# Patient Record
Sex: MALE | Race: WHITE | NOT HISPANIC OR LATINO | Employment: OTHER | URBAN - METROPOLITAN AREA
[De-identification: names, ages, dates, MRNs, and addresses within clinical notes are randomized per-mention and may not be internally consistent; named-entity substitution may affect disease eponyms.]

---

## 2023-05-19 ENCOUNTER — HOSPITAL ENCOUNTER (OUTPATIENT)
Dept: DATA CONVERSION | Facility: HOSPITAL | Age: 78
End: 2023-05-19
Attending: ANESTHESIOLOGY | Admitting: ANESTHESIOLOGY
Payer: MEDICARE

## 2023-05-19 DIAGNOSIS — N18.6 END STAGE RENAL DISEASE (MULTI): ICD-10-CM

## 2023-05-19 DIAGNOSIS — Z99.2 DEPENDENCE ON RENAL DIALYSIS (CMS-HCC): ICD-10-CM

## 2023-05-19 DIAGNOSIS — E11.42 TYPE 2 DIABETES MELLITUS WITH DIABETIC POLYNEUROPATHY (MULTI): ICD-10-CM

## 2023-05-19 DIAGNOSIS — E11.22 TYPE 2 DIABETES MELLITUS WITH DIABETIC CHRONIC KIDNEY DISEASE (MULTI): ICD-10-CM

## 2023-05-19 DIAGNOSIS — R52 PAIN, UNSPECIFIED: ICD-10-CM

## 2023-05-19 DIAGNOSIS — I12.0 HYPERTENSIVE CHRONIC KIDNEY DISEASE WITH STAGE 5 CHRONIC KIDNEY DISEASE OR END STAGE RENAL DISEASE (MULTI): ICD-10-CM

## 2023-10-02 NOTE — OP NOTE
Post Operative Note:     PreOp Diagnosis: Painful diabetic neuropathy   Post-Procedure Diagnosis: Painful diabetic neuropathy   Procedure: 1.  Right sciatic peripheral nerve stimulator  insertion under ultrasound guidance  2.  Moderate sedation   Surgeon: Kamaljit Mackey MD PhD   Resident/Fellow/Other Assistant: Vince Mabry DO   Estimated Blood Loss (mL): none   Specimen: no   Findings: None     Operative Report Dictated:  Dictation: not applicable - note contains Operative  Report   Operative Report:    The patient is a pleasant 77-year-old gentleman with a past medical history of ESRD as well as CABG and painful diabetic neuropathy presenting today for right-sided  peripheral nerve stimulator with the Sprint system targeting the right sciatic nerve.    Informed consent was obtained and the patient was brought into the procedure area and monitors were placed.  Prior to this the patient was given 2 g of cefazolin intravenously.  A timeout was then performed confirming the correct patient and procedure.  The patient was then sterilely prepped and draped   Next, the patient was moved over to the procedure bed and placed in the left lateral decubitus.  Ultrasound was utilized to identify the sciatic nerve at a point between the greater trochanter and ischial  tuberosity.  And this was traced distally with appropriate identification of the sciatic nerve.  and these areas were again identified skin marks were made with a marking pen to identify the sciatic nerve.  Skin overlying the identified trajectory of  the sciatic nerve was anesthetized with 1.0% lidocaine.  A sprint system stimulator needle was then advanced along the appropriate trajectory to achieve the appropriate depth under direct ultrasound visualization.  Next, stimulation was performed under  direct ultrasound visualization with appropriate stimulation in the distribution of the sciatic nerve distally covering the areas of the patients pain.  The  stylet of the stimulator needle was then removed and the stimulator lead was then placed in its  appropriate needle, stimulation was again performed indicating appropriate stimulation in the distribution of the sciatic nerve covering the patient's painful areas into the lower extremity.  Stimulator needle was then removed leaving the stimulator lead  in place appropriately, this was then anchored in place at the skin surface using Dermabond and covered with occlusive dressings and affixed to the external generator battery.  A lateral radiograph was performed to identify stimulator lead placement and  approximate position posterior to the femur along the distribution of the sciatic nerve.  The patient was then transferred to the recovery bed and transported to the postprocedure recovery area.  Patient tolerated procedure without any complication.           Attestation:   Note Completion:  I am a: Resident/Fellow   Attending Attestation I was present for the entire procedure          Electronic Signatures:  Kamaljit Mackey)  (Signed 19-May-2023 16:24)   Authored: Note Completion   Co-Signer: Post Operative Note, Note Completion  Vince Mabry ( (Resident))  (Signed 19-May-2023 15:20)   Authored: Post Operative Note, Note Completion      Last Updated: 19-May-2023 16:24 by Kamaljit Mackey)

## 2023-10-12 ENCOUNTER — PREP FOR PROCEDURE (OUTPATIENT)
Dept: PAIN MEDICINE | Facility: CLINIC | Age: 78
End: 2023-10-12
Payer: MEDICARE

## 2023-10-12 DIAGNOSIS — G57.71 CAUSALGIA OF LOWER EXTREMITY, RIGHT: Primary | ICD-10-CM

## 2023-10-12 DIAGNOSIS — Z89.431 STATUS POST AMPUTATION OF RIGHT FOOT THROUGH METATARSAL BONE (MULTI): ICD-10-CM

## 2023-10-14 PROBLEM — Z97.3 WEARS EYEGLASSES: Status: ACTIVE | Noted: 2023-10-14

## 2023-10-14 PROBLEM — R33.9 URINARY RETENTION: Status: ACTIVE | Noted: 2023-10-14

## 2023-10-14 PROBLEM — H35.373 EPIRETINAL MEMBRANE (ERM) OF BOTH EYES: Status: ACTIVE | Noted: 2023-10-14

## 2023-10-14 PROBLEM — I83.11 VARICOSE VEINS OF RIGHT LOWER EXTREMITY WITH INFLAMMATION: Status: ACTIVE | Noted: 2023-10-14

## 2023-10-14 PROBLEM — R50.9 FEVER: Status: ACTIVE | Noted: 2023-10-14

## 2023-10-14 PROBLEM — F41.9 ANXIETY: Status: ACTIVE | Noted: 2023-10-14

## 2023-10-14 PROBLEM — H18.419 ARCUS SENILIS OF EYE: Status: ACTIVE | Noted: 2023-10-14

## 2023-10-14 PROBLEM — H53.002 AMBLYOPIA OF LEFT EYE: Status: ACTIVE | Noted: 2023-10-14

## 2023-10-14 PROBLEM — N20.0 KIDNEY STONE: Status: ACTIVE | Noted: 2023-10-14

## 2023-10-14 PROBLEM — R39.15 URINARY URGENCY: Status: ACTIVE | Noted: 2023-10-14

## 2023-10-14 PROBLEM — R10.9 ABDOMINAL DISCOMFORT: Status: ACTIVE | Noted: 2023-10-14

## 2023-10-14 PROBLEM — D69.6 THROMBOCYTOPENIC DISORDER (CMS-HCC): Status: ACTIVE | Noted: 2023-10-14

## 2023-10-14 PROBLEM — L98.499 TYPE 2 DIABETES MELLITUS WITH ULCER (MULTI): Status: ACTIVE | Noted: 2023-10-14

## 2023-10-14 PROBLEM — R41.82 ALTERED MENTAL STATUS: Status: ACTIVE | Noted: 2023-10-14

## 2023-10-14 PROBLEM — M51.369 DISC DEGENERATION, LUMBAR: Status: ACTIVE | Noted: 2023-10-14

## 2023-10-14 PROBLEM — D63.8 ANEMIA OF CHRONIC DISEASE: Status: ACTIVE | Noted: 2023-10-14

## 2023-10-14 PROBLEM — Z96.1 PSEUDOPHAKIA OF BOTH EYES: Status: ACTIVE | Noted: 2023-10-14

## 2023-10-14 PROBLEM — M51.36 DISC DEGENERATION, LUMBAR: Status: ACTIVE | Noted: 2023-10-14

## 2023-10-14 PROBLEM — H11.159 PINGUECULA: Status: ACTIVE | Noted: 2023-10-14

## 2023-10-14 PROBLEM — M10.9 GOUT: Status: ACTIVE | Noted: 2023-10-14

## 2023-10-14 PROBLEM — E78.2 MIXED HYPERLIPIDEMIA: Status: ACTIVE | Noted: 2023-10-14

## 2023-10-14 PROBLEM — I38 VALVULAR ENDOCARDITIS: Status: ACTIVE | Noted: 2023-10-14

## 2023-10-14 PROBLEM — E78.5 DYSLIPIDEMIA: Status: ACTIVE | Noted: 2023-10-14

## 2023-10-14 PROBLEM — Z99.2 END STAGE RENAL FAILURE ON DIALYSIS (MULTI): Status: ACTIVE | Noted: 2023-10-14

## 2023-10-14 PROBLEM — R73.09 BLOOD GLUCOSE ABNORMAL: Status: ACTIVE | Noted: 2023-10-14

## 2023-10-14 PROBLEM — N18.6 END STAGE RENAL FAILURE ON DIALYSIS (MULTI): Status: ACTIVE | Noted: 2023-10-14

## 2023-10-14 PROBLEM — H02.829 EYELID CYST: Status: ACTIVE | Noted: 2023-10-14

## 2023-10-14 PROBLEM — I20.9 ANGINA PECTORIS (CMS-HCC): Status: ACTIVE | Noted: 2023-10-14

## 2023-10-14 PROBLEM — M62.81 MUSCLE WEAKNESS: Status: ACTIVE | Noted: 2023-10-14

## 2023-10-14 PROBLEM — I63.9 CEREBROVASCULAR ACCIDENT (MULTI): Status: ACTIVE | Noted: 2023-10-14

## 2023-10-14 PROBLEM — T81.31XA DEHISCENCE OF OPERATIVE WOUND: Status: ACTIVE | Noted: 2023-10-14

## 2023-10-14 PROBLEM — I10 BENIGN ESSENTIAL HYPERTENSION: Status: ACTIVE | Noted: 2023-10-14

## 2023-10-14 PROBLEM — R60.9 EDEMA: Status: ACTIVE | Noted: 2023-10-14

## 2023-10-14 PROBLEM — R53.83 LETHARGY: Status: ACTIVE | Noted: 2023-10-14

## 2023-10-14 PROBLEM — E11.42 DIABETIC PERIPHERAL NEUROPATHY (MULTI): Status: ACTIVE | Noted: 2023-10-14

## 2023-10-14 PROBLEM — M54.17 LUMBOSACRAL RADICULITIS: Status: ACTIVE | Noted: 2023-10-14

## 2023-10-14 PROBLEM — E66.01 MORBID OBESITY (MULTI): Status: ACTIVE | Noted: 2023-10-14

## 2023-10-14 PROBLEM — J18.9 PNEUMONIA: Status: ACTIVE | Noted: 2023-10-14

## 2023-10-14 PROBLEM — H26.9 CATARACT: Status: ACTIVE | Noted: 2023-10-14

## 2023-10-14 PROBLEM — T85.9XXA COMPLICATION OF CATHETER: Status: ACTIVE | Noted: 2023-10-14

## 2023-10-14 PROBLEM — K21.9 GASTROESOPHAGEAL REFLUX DISEASE: Status: ACTIVE | Noted: 2023-10-14

## 2023-10-14 PROBLEM — G47.33 OBSTRUCTIVE SLEEP APNEA: Status: ACTIVE | Noted: 2023-10-14

## 2023-10-14 PROBLEM — S21.209A BACK WOUND: Status: ACTIVE | Noted: 2023-10-14

## 2023-10-14 PROBLEM — I25.119 ATHEROSCLEROTIC HEART DISEASE OF NATIVE CORONARY ARTERY WITH UNSPECIFIED ANGINA PECTORIS (CMS-HCC): Status: ACTIVE | Noted: 2023-10-14

## 2023-10-14 PROBLEM — N18.4 STAGE 4 CHRONIC KIDNEY DISEASE (MULTI): Status: ACTIVE | Noted: 2023-10-14

## 2023-10-14 PROBLEM — M96.1 POSTLAMINECTOMY SYNDROME, LUMBAR: Status: ACTIVE | Noted: 2023-10-14

## 2023-10-14 PROBLEM — G62.9 PERIPHERAL NEUROPATHY: Status: ACTIVE | Noted: 2023-10-14

## 2023-10-14 PROBLEM — F33.1 MODERATE RECURRENT MAJOR DEPRESSION (MULTI): Status: ACTIVE | Noted: 2023-10-14

## 2023-10-14 PROBLEM — I50.1 LEFT VENTRICULAR FAILURE (MULTI): Status: ACTIVE | Noted: 2023-10-14

## 2023-10-14 PROBLEM — T50.905A ADVERSE REACTION TO DRUG: Status: ACTIVE | Noted: 2023-10-14

## 2023-10-14 PROBLEM — I51.89 DIASTOLIC DYSFUNCTION: Status: ACTIVE | Noted: 2023-10-14
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
## 2023-10-14 PROBLEM — Z97.8 PRESENCE OF INTRATHECAL PUMP: Status: ACTIVE | Noted: 2023-10-14

## 2023-10-14 PROBLEM — S91.309A OPEN WOUND OF FOOT: Status: ACTIVE | Noted: 2023-10-14

## 2023-10-14 PROBLEM — E11.3493: Status: ACTIVE | Noted: 2023-10-14

## 2023-10-14 PROBLEM — E11.622 TYPE 2 DIABETES MELLITUS WITH ULCER (MULTI): Status: ACTIVE | Noted: 2023-10-14

## 2023-10-14 RX ORDER — BUPROPION HYDROCHLORIDE 150 MG/1
TABLET ORAL
Status: ON HOLD | COMMUNITY
Start: 2016-06-23 | End: 2023-11-15 | Stop reason: ALTCHOICE

## 2023-10-14 RX ORDER — CARVEDILOL 3.12 MG/1
3.12 TABLET ORAL 2 TIMES DAILY
COMMUNITY
Start: 2016-06-03

## 2023-10-14 RX ORDER — AMLODIPINE BESYLATE 5 MG/1
5 TABLET ORAL DAILY
Status: ON HOLD | COMMUNITY
End: 2023-11-15 | Stop reason: ALTCHOICE

## 2023-10-14 RX ORDER — CLOTRIMAZOLE AND BETAMETHASONE DIPROPIONATE 10; .64 MG/G; MG/G
1 CREAM TOPICAL 2 TIMES DAILY
COMMUNITY
Start: 2023-07-17

## 2023-10-14 RX ORDER — RANOLAZINE 1000 MG/1
1000 TABLET, EXTENDED RELEASE ORAL 2 TIMES DAILY
Status: ON HOLD | COMMUNITY
End: 2023-11-15 | Stop reason: ALTCHOICE

## 2023-10-14 RX ORDER — PANTOPRAZOLE SODIUM 40 MG/1
40 TABLET, DELAYED RELEASE ORAL DAILY
COMMUNITY
Start: 2015-01-06

## 2023-10-14 RX ORDER — SUCRALFATE 1 G/10ML
10 SUSPENSION ORAL 2 TIMES DAILY
Status: ON HOLD | COMMUNITY
End: 2023-11-15 | Stop reason: ALTCHOICE

## 2023-10-14 RX ORDER — GABAPENTIN 100 MG/1
200 CAPSULE ORAL 2 TIMES DAILY
COMMUNITY
Start: 2016-08-12

## 2023-10-14 RX ORDER — NALOXONE HYDROCHLORIDE 4 MG/.1ML
0.1 SPRAY NASAL AS NEEDED
COMMUNITY
Start: 2020-02-24

## 2023-10-14 RX ORDER — SILODOSIN 8 MG/1
CAPSULE ORAL
Status: ON HOLD | COMMUNITY
Start: 2023-04-16 | End: 2023-11-15 | Stop reason: ALTCHOICE

## 2023-10-14 RX ORDER — PREGABALIN 50 MG/1
1 CAPSULE ORAL 2 TIMES DAILY
Status: ON HOLD | COMMUNITY
Start: 2015-08-18 | End: 2023-11-15 | Stop reason: ALTCHOICE

## 2023-10-14 RX ORDER — CARVEDILOL 12.5 MG/1
3.12 TABLET ORAL
COMMUNITY
Start: 2023-04-11

## 2023-10-14 RX ORDER — B COMPLEX, C NO.20/FOLIC ACID 1 MG
1 CAPSULE ORAL DAILY
Status: ON HOLD | COMMUNITY
End: 2023-11-15 | Stop reason: ALTCHOICE

## 2023-10-14 RX ORDER — INSULIN ASPART 100 [IU]/ML
6-10 INJECTION, SOLUTION INTRAVENOUS; SUBCUTANEOUS 3 TIMES DAILY
Status: ON HOLD | COMMUNITY
Start: 2015-06-25 | End: 2023-11-15 | Stop reason: ALTCHOICE

## 2023-10-14 RX ORDER — VIT B COMP NO.3/FOLIC/C/BIOTIN 1 MG-60 MG
1 TABLET ORAL DAILY
COMMUNITY
Start: 2023-08-12

## 2023-10-14 RX ORDER — CALCITRIOL 0.25 UG/1
0.25 CAPSULE ORAL
Status: ON HOLD | COMMUNITY
Start: 2015-11-30 | End: 2023-11-15 | Stop reason: ALTCHOICE

## 2023-10-14 RX ORDER — SYRINGE,SAFETY WITH NEEDLE,1ML 25GX1"
SYRINGE (EA) MISCELLANEOUS
Status: ON HOLD | COMMUNITY
Start: 2014-07-09 | End: 2023-11-15 | Stop reason: ALTCHOICE

## 2023-10-14 RX ORDER — ATORVASTATIN CALCIUM 40 MG/1
40 TABLET, FILM COATED ORAL
COMMUNITY
Start: 2016-07-08

## 2023-10-14 RX ORDER — AMITRIPTYLINE HYDROCHLORIDE 10 MG/1
1.5 TABLET, FILM COATED ORAL NIGHTLY
Status: ON HOLD | COMMUNITY
End: 2023-11-15 | Stop reason: ALTCHOICE

## 2023-10-14 RX ORDER — ATORVASTATIN CALCIUM 20 MG/1
20 TABLET, FILM COATED ORAL DAILY
COMMUNITY
Start: 2013-02-22

## 2023-10-14 RX ORDER — FUROSEMIDE 80 MG/1
80 TABLET ORAL DAILY
Status: ON HOLD | COMMUNITY
Start: 2015-10-22 | End: 2023-11-15 | Stop reason: ALTCHOICE

## 2023-10-14 RX ORDER — SERTRALINE HYDROCHLORIDE 50 MG/1
TABLET, FILM COATED ORAL
COMMUNITY
Start: 2023-08-10

## 2023-10-14 RX ORDER — ERGOCALCIFEROL 1.25 MG/1
50000 CAPSULE ORAL
COMMUNITY
Start: 2016-06-10

## 2023-10-14 RX ORDER — TRAMADOL HYDROCHLORIDE 50 MG/1
50 TABLET ORAL AS NEEDED
COMMUNITY
Start: 2013-06-20

## 2023-10-14 RX ORDER — LORAZEPAM 0.5 MG/1
TABLET ORAL
Status: ON HOLD | COMMUNITY
Start: 2016-06-23 | End: 2023-11-15 | Stop reason: ALTCHOICE

## 2023-10-14 RX ORDER — VILAZODONE HYDROCHLORIDE 20 MG/1
TABLET ORAL
Status: ON HOLD | COMMUNITY
Start: 2020-04-10 | End: 2023-11-15 | Stop reason: ALTCHOICE

## 2023-10-14 RX ORDER — SEVELAMER CARBONATE 800 MG/1
3 TABLET, FILM COATED ORAL
COMMUNITY

## 2023-10-14 RX ORDER — SODIUM POLYSTYRENE SULFONATE 4.1 MEQ/G
POWDER, FOR SUSPENSION ORAL; RECTAL
Status: ON HOLD | COMMUNITY
Start: 2015-08-18 | End: 2023-11-15 | Stop reason: ALTCHOICE

## 2023-10-14 RX ORDER — SORBITOL SOLUTION 70 %
30 SOLUTION, ORAL MISCELLANEOUS EVERY 4 HOURS PRN
COMMUNITY
Start: 2015-01-07

## 2023-10-14 RX ORDER — LIDOCAINE 50 MG/G
OINTMENT TOPICAL
Status: ON HOLD | COMMUNITY
Start: 2016-06-20 | End: 2023-11-15 | Stop reason: ALTCHOICE

## 2023-10-14 RX ORDER — TOPIRAMATE 50 MG/1
50 TABLET, FILM COATED ORAL
COMMUNITY
Start: 2016-12-02

## 2023-10-14 RX ORDER — METOPROLOL SUCCINATE 100 MG/1
100 TABLET, EXTENDED RELEASE ORAL DAILY
Status: ON HOLD | COMMUNITY
End: 2023-11-15 | Stop reason: ALTCHOICE

## 2023-10-14 RX ORDER — ALLOPURINOL 100 MG/1
100 TABLET ORAL DAILY
COMMUNITY
Start: 2012-11-20

## 2023-10-14 RX ORDER — TORSEMIDE 100 MG/1
100 TABLET ORAL
Status: ON HOLD | COMMUNITY
Start: 2015-12-22 | End: 2023-11-15 | Stop reason: ALTCHOICE

## 2023-10-14 RX ORDER — NAPROXEN SODIUM 220 MG/1
81 TABLET, FILM COATED ORAL DAILY
COMMUNITY

## 2023-10-14 RX ORDER — PEN NEEDLE, DIABETIC 30 GX3/16"
NEEDLE, DISPOSABLE MISCELLANEOUS 4 TIMES DAILY
Status: ON HOLD | COMMUNITY
End: 2023-11-15 | Stop reason: ALTCHOICE

## 2023-10-14 RX ORDER — ISOSORBIDE MONONITRATE 30 MG/1
30 TABLET, EXTENDED RELEASE ORAL DAILY
Status: ON HOLD | COMMUNITY
Start: 2015-10-17 | End: 2023-11-15 | Stop reason: ALTCHOICE

## 2023-10-14 RX ORDER — SILVER 2" X 2"
0.25 BANDAGE TOPICAL DAILY
Status: ON HOLD | COMMUNITY
Start: 2023-03-03 | End: 2023-11-15 | Stop reason: ALTCHOICE

## 2023-10-14 RX ORDER — CLOPIDOGREL BISULFATE 75 MG/1
75 TABLET ORAL DAILY
Status: ON HOLD | COMMUNITY
End: 2023-11-15 | Stop reason: ALTCHOICE

## 2023-10-14 RX ORDER — CALCITRIOL 0.5 UG/1
1 CAPSULE ORAL DAILY
Status: ON HOLD | COMMUNITY
Start: 2016-02-29 | End: 2023-11-15 | Stop reason: ALTCHOICE

## 2023-10-14 RX ORDER — CYANOCOBALAMIN (VITAMIN B-12) 500 MCG
TABLET ORAL
COMMUNITY

## 2023-10-14 RX ORDER — BLOOD SUGAR DIAGNOSTIC
STRIP MISCELLANEOUS 3 TIMES DAILY
COMMUNITY

## 2023-10-14 RX ORDER — AMITRIPTYLINE HYDROCHLORIDE 25 MG/1
1 TABLET, FILM COATED ORAL NIGHTLY
Status: ON HOLD | COMMUNITY
Start: 2019-04-17 | End: 2023-11-15 | Stop reason: ALTCHOICE

## 2023-10-14 RX ORDER — PARICALCITOL 1 UG/1
1 CAPSULE, LIQUID FILLED ORAL DAILY
Status: ON HOLD | COMMUNITY
End: 2023-11-15 | Stop reason: ALTCHOICE

## 2023-10-14 RX ORDER — LANCETS
EACH MISCELLANEOUS 3 TIMES DAILY
COMMUNITY
Start: 2023-05-31

## 2023-10-14 RX ORDER — MIDODRINE HYDROCHLORIDE 10 MG/1
10 TABLET ORAL 3 TIMES WEEKLY
COMMUNITY

## 2023-10-14 RX ORDER — LIDOCAINE AND PRILOCAINE 25; 25 MG/G; MG/G
CREAM TOPICAL
Status: ON HOLD | COMMUNITY
Start: 2022-06-17 | End: 2023-11-15 | Stop reason: ALTCHOICE

## 2023-10-16 ENCOUNTER — OFFICE VISIT (OUTPATIENT)
Dept: PAIN MEDICINE | Facility: HOSPITAL | Age: 78
End: 2023-10-16
Payer: MEDICARE

## 2023-10-16 DIAGNOSIS — G57.71 CAUSALGIA OF LOWER EXTREMITY, RIGHT: Primary | ICD-10-CM

## 2023-10-16 PROCEDURE — 1158F ADVNC CARE PLAN TLK DOCD: CPT | Performed by: ANESTHESIOLOGY

## 2023-10-16 PROCEDURE — 1125F AMNT PAIN NOTED PAIN PRSNT: CPT | Performed by: ANESTHESIOLOGY

## 2023-10-16 PROCEDURE — 99214 OFFICE O/P EST MOD 30 MIN: CPT | Performed by: ANESTHESIOLOGY

## 2023-10-16 PROCEDURE — 1159F MED LIST DOCD IN RCRD: CPT | Performed by: ANESTHESIOLOGY

## 2023-10-16 PROCEDURE — 1036F TOBACCO NON-USER: CPT | Performed by: ANESTHESIOLOGY

## 2023-10-16 ASSESSMENT — PAIN - FUNCTIONAL ASSESSMENT: PAIN_FUNCTIONAL_ASSESSMENT: 0-10

## 2023-10-16 ASSESSMENT — PAIN SCALES - GENERAL: PAINLEVEL_OUTOF10: 6

## 2023-10-16 NOTE — PROGRESS NOTES
Subjective   Patient ID: Amrik Gould is a 78 y.o. male.    HPI  Patient is a 78-year-old male who presents as a follow-up visit in pain clinic today to discuss peripheral nerve stimulator implant options.  Patient states since the explantation of his Sprint device, he has noticed a recurrence of his previous pain in his right lower extremity.  Pain is from mid right foot to his toes.  Gabapentin increased to 200 mg nightly with an additional 100 mg nightly on hemodialysis days.  Also started amitriptyline 5 mg nightly.    Review of Systems  A 13 point comprehensive review of system was negative except for specific complaints as listed in the HPI.    Objective   Physical Exam  Constitutional: Awake, alert, well appearing, well developed. No acute distress, Patient appears stated age. Sitting in wheelchair comfortably.  Ears, Nose, Mouth, and Throat: External ears and nose appear to be without deformity or rash. No lesions or masses noted. Hearing is grossly intact.  Head and Face: Examination of the head and face revealed no abnormalities.  Eyes: Conjunctiva non-icteric and eye lids are without obvious rash or drooping. Pupils are symmetric.  Neck: Normal appearing without swelling, tracheal position is midline.  Respiratory: No gasping or shortness of breath noted. Normal respiratory movements without use of accessory muscles.   Cardiovascular: No peripheral edema present.   Skin: No rashes or open lesions/ulcers identified on skin.  Psychiatric: Mood and affect are normal.  MSK:  Right foot digits 2 through 5 amputated. No edema or redness of the foot.  Neurologic:   Motor strength:  Unable to move right ankle or toes. Right knee extension 4/5.   Sensation:  Sensation decreased to light touch in the bilateral lower extremities up to knees bilaterally in stocking distribution    Assessment/Plan     Patient is a 78-year-old male who presents for follow-up visit to discuss peripheral nerve stimulator implantation.   Patient received 100% relief with Sprint nerve stimulator, will plan for Nalu versus Curonix peripheral nerve stimulator implant on Friday, 10/27/2023.  ESRD, GFR 8 on hemodialysis Tuesday, Thursday, Saturday.    Plan:  -Schedule for peripheral nerve stimulator implantation      The patient was invited to contact us back anytime with any questions or concerns and follow-up with us in the office as needed.

## 2023-10-18 ENCOUNTER — PREP FOR PROCEDURE (OUTPATIENT)
Dept: PAIN MEDICINE | Facility: CLINIC | Age: 78
End: 2023-10-18
Payer: MEDICARE

## 2023-10-23 DIAGNOSIS — G57.71 CAUSALGIA OF LOWER EXTREMITY, RIGHT: Primary | ICD-10-CM

## 2023-10-23 RX ORDER — MUPIROCIN 20 MG/G
OINTMENT TOPICAL
Qty: 15 G | Refills: 0 | Status: SHIPPED | OUTPATIENT
Start: 2023-10-23

## 2023-10-26 ENCOUNTER — ANESTHESIA EVENT (OUTPATIENT)
Dept: OPERATING ROOM | Facility: HOSPITAL | Age: 78
End: 2023-10-26
Payer: MEDICARE

## 2023-10-26 NOTE — H&P
History Of Present Illness  Amrik Gould is a 78 y.o. male with history of lumbar post laminectomy syndrome s/p IT pump, diabetes and end-stage renal disease on hemodialysis, CAD s/p CABG, presenting for Right Sciatic Peripheral nerve stimulator implantation. Endorses no changes in past medical history or medical health since last seen in clinic.     Past Medical History  He has a past medical history of Obstructive sleep apnea (adult) (pediatric) (07/09/2014), Other conditions influencing health status, Other conditions influencing health status, Personal history of other diseases of the circulatory system, Personal history of other diseases of the digestive system, Personal history of other diseases of the musculoskeletal system and connective tissue, Personal history of other diseases of the nervous system and sense organs, Personal history of other diseases of urinary system, Personal history of other endocrine, nutritional and metabolic disease, and Personal history of other endocrine, nutritional and metabolic disease.    Surgical History  He has a past surgical history that includes Other surgical history (09/29/2014); Other surgical history (07/31/2019); Lumbar fusion (07/09/2014); and Spine surgery (07/09/2014).     Social History  He has no history on file for tobacco use, alcohol use, and drug use.    Family History  Family History   Family history unknown: Yes        Allergies  Baclofen, Duloxetine, Gabapentin, Lisinopril, and Pregabalin    Review of Symptoms:   Constitutional: Negative for chills, diaphoresis or fever  HENT: Negative for neck swelling  Eyes:.  Negative for eye pain  Respiratory:.  Negative for cough, shortness of breath or wheezing    Cardiovascular:.  Negative for chest pain or palpitations  Gastrointestinal:.  Negative for abdominal pain, nausea and vomiting  Genitourinary:.  Negative for urgency  Musculoskeletal:  Positive for back pain. Positive for joint pain. Denies falls within  the past 3 months.  Skin: Negative for wounds or itching   Neurological: Negative for dizziness, seizures, loss of consciousness and weakness  Endo/Heme/Allergies: Does not bruise/bleed easily  Psychiatric/Behavioral: Negative for depression. The patient does not appear anxious.       PHYSICAL EXAM  Vitals signs reviewed  Constitutional:       General: Not in acute distress     Appearance: Normal appearance. Not ill-appearing.  HENT:     Head: Normocephalic and atraumatic  Eyes:     Conjunctiva/sclera: Conjunctivae normal  Cardiovascular:     Rate and Rhythm: Normal rate and regular rhythm  Pulmonary:     Effort: No respiratory distress  Abdominal:     Palpations: Abdomen is soft  Musculoskeletal: GUAJARDO  Skin:     General: Skin is warm and dry  Neurological:     General: No focal deficit present  Psychiatric:         Mood and Affect: Mood normal         Behavior: Behavior normal     Last Recorded Vitals  There were no vitals taken for this visit.    Relevant Results  Current Outpatient Medications   Medication Instructions    Accu-Chek Fastclix Lancet Drum misc 3 times daily,  USE TO TEST AS DIRECTED    Accu-Chek Guide test strips strip 3 times daily,  USE TO TEST BLOOD SUGARS AS DIRECTED    allopurinol (ZYLOPRIM) 100 mg, oral, Daily    amitriptyline (Elavil) 10 mg tablet 1.5 tablets, oral, Nightly    amitriptyline (Elavil) 25 mg tablet 1 tablet, oral, Nightly    amLODIPine (NORVASC) 5 mg, oral, Daily    aspirin 81 mg, oral, Daily    atorvastatin (LIPITOR) 40 mg, oral    atorvastatin (LIPITOR) 20 mg, oral, Daily    buPROPion XL (Wellbutrin XL) 150 mg 24 hr tablet oral    calcitriol (RocaltroL) 0.5 mcg capsule 1 capsule, oral, Daily    calcitriol (ROCALTROL) 0.25 mcg, oral    carvedilol (COREG) 3.125 mg, oral, 2 times daily    carvedilol (COREG) 12.5 mg, oral    cholecalciferol (Vitamin D-3) 10 MCG (400 UNIT) tablet oral    clopidogrel (PLAVIX) 75 mg, oral, Daily    clotrimazole-betamethasone (Lotrisone) cream 1  "Application, Topical, 2 times daily, to affected area    ergocalciferol (VITAMIN D-2) 50,000 Units, oral, Weekly    furosemide (LASIX) 80 mg, oral, Daily    gabapentin (NEURONTIN) 200 mg, oral, 2 times daily    insulin aspart (NOVOLOG FLEXPEN U-100 INSULIN) 6-10 Units, subcutaneous, 3 times daily    insulin detemir (Levemir) 100 unit/mL injection subcutaneous    insulin syringe-needle U-100 (BD Insulin Syringe Ultra-Fine) 31G X 5/16\" 1 mL syringe subcutaneous,  as directed<BR>    Iodosorb 0.25 g, Topical, Daily, TO EACH WOUND ON LEFT FOOT    isosorbide mononitrate ER (IMDUR) 30 mg, oral, Daily    lidocaine (Xylocaine) 5 % ointment Topical    lidocaine-prilocaine (Emla) 2.5-2.5 % cream Topical,  1 HOUR BEFORE PROCEDURE AS DIRECTED.    LORazepam (Ativan) 0.5 mg tablet oral    metoprolol succinate XL (TOPROL-XL) 100 mg, oral, Daily    midodrine (PROAMATINE) 10 mg, oral, 3 times weekly,  Take one tablet on the way to dialysis. Do not take carvedilol before dialysis.<BR>    multivitamin with minerals tablet oral    mupirocin (Bactroban) 2 % ointment Use three days before and 2 days after surgery    naloxone (Narcan) 4 mg/0.1 mL nasal spray 0.1 mL, nasal, As needed, FOR ACCIDENTAL OVERDOSE    pantoprazole (PROTONIX) 40 mg, oral, Daily    paricalcitol (Zemplar) 1 mcg capsule 1 capsule, oral, Daily    pen needle, diabetic 32 gauge x 5/32\" needle subcutaneous, 4 times daily    pregabalin (Lyrica) 50 mg capsule 1 capsule, oral, 2 times daily    ranolazine (RANEXA) 1,000 mg, oral, 2 times daily    Lesley-Victor Hugo Rx 1- mg-mg-mcg tablet 1 tablet, oral, Daily    sertraline (Zoloft) 50 mg tablet oral    sevelamer carbonate (Renvela) 800 mg tablet 3 tablets, oral, 3 times daily with meals    silodosin (Rapaflo) 8 mg capsule oral    sodium polystyrene sulfonate (Kayexalate) powder oral    sorbitol 70 % solution 30 mL, oral, Every 4 hours PRN, until loos BM then prn<BR>    sucralfate (Carafate) 100 mg/mL suspension 10 mL, oral, 2 " times daily    topiramate (TOPAMAX) 50 mg, oral,  in addition to regular prescription after each dialysis session<BR>    torsemide (DEMADEX) 100 mg, oral, non dialysis days only    traMADol (ULTRAM) 50 mg, oral, As needed, after each dialysis session    vilazodone (Viibryd) 20 mg tablet oral    Wescaps 1 mg capsule 1 capsule, oral, Daily       No results found for this or any previous visit from the past 1000 days.     No image results found.     No diagnosis found.     ASSESSMENT/PLANR  Amrik Gould is a 78 y.o. male presents for Right Sciatic Peripheral nerve stimulator implantation.    Our plan is as follows:  - Follow In pain clinic  - Continue to participate in physical therapy as well as physician directed home exercises  - Continue pain medications as prescribed       Pepe Dhillon MD

## 2023-10-27 ENCOUNTER — HOSPITAL ENCOUNTER (OUTPATIENT)
Facility: HOSPITAL | Age: 78
Setting detail: OUTPATIENT SURGERY
Discharge: HOME | End: 2023-10-27
Attending: ANESTHESIOLOGY | Admitting: ANESTHESIOLOGY
Payer: MEDICARE

## 2023-10-27 ENCOUNTER — ANESTHESIA (OUTPATIENT)
Dept: OPERATING ROOM | Facility: HOSPITAL | Age: 78
End: 2023-10-27
Payer: MEDICARE

## 2023-10-27 ENCOUNTER — APPOINTMENT (OUTPATIENT)
Dept: RADIOLOGY | Facility: HOSPITAL | Age: 78
End: 2023-10-27
Payer: MEDICARE

## 2023-10-27 VITALS
OXYGEN SATURATION: 100 % | HEIGHT: 69 IN | DIASTOLIC BLOOD PRESSURE: 59 MMHG | SYSTOLIC BLOOD PRESSURE: 132 MMHG | TEMPERATURE: 97.2 F | BODY MASS INDEX: 31.84 KG/M2 | WEIGHT: 214.95 LBS | HEART RATE: 73 BPM | RESPIRATION RATE: 18 BRPM

## 2023-10-27 DIAGNOSIS — Z79.2 PROPHYLACTIC ANTIBIOTIC: ICD-10-CM

## 2023-10-27 DIAGNOSIS — Z97.8 PRESENCE OF INTRATHECAL PUMP: Primary | ICD-10-CM

## 2023-10-27 DIAGNOSIS — G89.18 POSTOPERATIVE PAIN: ICD-10-CM

## 2023-10-27 DIAGNOSIS — Z89.431 STATUS POST AMPUTATION OF RIGHT FOOT THROUGH METATARSAL BONE (MULTI): ICD-10-CM

## 2023-10-27 DIAGNOSIS — G57.71 CAUSALGIA OF LOWER EXTREMITY, RIGHT: ICD-10-CM

## 2023-10-27 LAB
ABO GROUP (TYPE) IN BLOOD: NORMAL
ANTIBODY SCREEN: NORMAL
ERYTHROCYTE [DISTWIDTH] IN BLOOD BY AUTOMATED COUNT: 17.3 % (ref 11.5–14.5)
GLUCOSE BLD MANUAL STRIP-MCNC: 113 MG/DL (ref 74–99)
GLUCOSE BLD MANUAL STRIP-MCNC: 96 MG/DL (ref 74–99)
HCT VFR BLD AUTO: 34.1 % (ref 41–52)
HGB BLD-MCNC: 11.1 G/DL (ref 13.5–17.5)
MCH RBC QN AUTO: 34 PG (ref 26–34)
MCHC RBC AUTO-ENTMCNC: 32.6 G/DL (ref 32–36)
MCV RBC AUTO: 105 FL (ref 80–100)
NRBC BLD-RTO: 0 /100 WBCS (ref 0–0)
PLATELET # BLD AUTO: 81 X10*3/UL (ref 150–450)
PMV BLD AUTO: 10.5 FL (ref 7.5–11.5)
RBC # BLD AUTO: 3.26 X10*6/UL (ref 4.5–5.9)
RH FACTOR (ANTIGEN D): NORMAL
WBC # BLD AUTO: 6.2 X10*3/UL (ref 4.4–11.3)

## 2023-10-27 PROCEDURE — 7100000010 HC PHASE TWO TIME - EACH INCREMENTAL 1 MINUTE: Performed by: ANESTHESIOLOGY

## 2023-10-27 PROCEDURE — 2500000004 HC RX 250 GENERAL PHARMACY W/ HCPCS (ALT 636 FOR OP/ED): Performed by: STUDENT IN AN ORGANIZED HEALTH CARE EDUCATION/TRAINING PROGRAM

## 2023-10-27 PROCEDURE — 7100000002 HC RECOVERY ROOM TIME - EACH INCREMENTAL 1 MINUTE: Performed by: ANESTHESIOLOGY

## 2023-10-27 PROCEDURE — 2500000004 HC RX 250 GENERAL PHARMACY W/ HCPCS (ALT 636 FOR OP/ED): Performed by: NURSE ANESTHETIST, CERTIFIED REGISTERED

## 2023-10-27 PROCEDURE — A64555 PR PERCUT IMPLANT,NEUROELEC,PERIPH NERVE: Performed by: ANESTHESIOLOGY

## 2023-10-27 PROCEDURE — 86901 BLOOD TYPING SEROLOGIC RH(D): CPT | Performed by: STUDENT IN AN ORGANIZED HEALTH CARE EDUCATION/TRAINING PROGRAM

## 2023-10-27 PROCEDURE — 36415 COLL VENOUS BLD VENIPUNCTURE: CPT | Performed by: STUDENT IN AN ORGANIZED HEALTH CARE EDUCATION/TRAINING PROGRAM

## 2023-10-27 PROCEDURE — 2500000005 HC RX 250 GENERAL PHARMACY W/O HCPCS: Performed by: NURSE ANESTHETIST, CERTIFIED REGISTERED

## 2023-10-27 PROCEDURE — 3700000002 HC GENERAL ANESTHESIA TIME - EACH INCREMENTAL 1 MINUTE: Performed by: ANESTHESIOLOGY

## 2023-10-27 PROCEDURE — 2720000007 HC OR 272 NO HCPCS: Performed by: ANESTHESIOLOGY

## 2023-10-27 PROCEDURE — A64555 PR PERCUT IMPLANT,NEUROELEC,PERIPH NERVE: Performed by: NURSE ANESTHETIST, CERTIFIED REGISTERED

## 2023-10-27 PROCEDURE — 3600000009 HC OR TIME - EACH INCREMENTAL 1 MINUTE - PROCEDURE LEVEL FOUR: Performed by: ANESTHESIOLOGY

## 2023-10-27 PROCEDURE — 2500000005 HC RX 250 GENERAL PHARMACY W/O HCPCS: Performed by: ANESTHESIOLOGY

## 2023-10-27 PROCEDURE — 64555 IMPLANT NEUROELECTRODES: CPT | Performed by: ANESTHESIOLOGY

## 2023-10-27 PROCEDURE — 99100 ANES PT EXTEME AGE<1 YR&>70: CPT | Performed by: ANESTHESIOLOGY

## 2023-10-27 PROCEDURE — 85027 COMPLETE CBC AUTOMATED: CPT | Performed by: STUDENT IN AN ORGANIZED HEALTH CARE EDUCATION/TRAINING PROGRAM

## 2023-10-27 PROCEDURE — 2780000003 HC OR 278 NO HCPCS: Performed by: ANESTHESIOLOGY

## 2023-10-27 PROCEDURE — 76942 ECHO GUIDE FOR BIOPSY: CPT | Performed by: ANESTHESIOLOGY

## 2023-10-27 PROCEDURE — 82947 ASSAY GLUCOSE BLOOD QUANT: CPT

## 2023-10-27 PROCEDURE — 7100000009 HC PHASE TWO TIME - INITIAL BASE CHARGE: Performed by: ANESTHESIOLOGY

## 2023-10-27 PROCEDURE — 3600000004 HC OR TIME - INITIAL BASE CHARGE - PROCEDURE LEVEL FOUR: Performed by: ANESTHESIOLOGY

## 2023-10-27 PROCEDURE — 3700000001 HC GENERAL ANESTHESIA TIME - INITIAL BASE CHARGE: Performed by: ANESTHESIOLOGY

## 2023-10-27 PROCEDURE — 7100000001 HC RECOVERY ROOM TIME - INITIAL BASE CHARGE: Performed by: ANESTHESIOLOGY

## 2023-10-27 RX ORDER — PHENYLEPHRINE HCL IN 0.9% NACL 1 MG/10 ML
SYRINGE (ML) INTRAVENOUS AS NEEDED
Status: DISCONTINUED | OUTPATIENT
Start: 2023-10-27 | End: 2023-10-27

## 2023-10-27 RX ORDER — OXYCODONE HYDROCHLORIDE 5 MG/1
5 TABLET ORAL EVERY 4 HOURS PRN
Status: CANCELLED | OUTPATIENT
Start: 2023-10-27

## 2023-10-27 RX ORDER — LIDOCAINE HYDROCHLORIDE 10 MG/ML
INJECTION INFILTRATION; PERINEURAL AS NEEDED
Status: DISCONTINUED | OUTPATIENT
Start: 2023-10-27 | End: 2023-10-27 | Stop reason: HOSPADM

## 2023-10-27 RX ORDER — ONDANSETRON HYDROCHLORIDE 2 MG/ML
INJECTION, SOLUTION INTRAVENOUS AS NEEDED
Status: DISCONTINUED | OUTPATIENT
Start: 2023-10-27 | End: 2023-10-27

## 2023-10-27 RX ORDER — HYDRALAZINE HYDROCHLORIDE 20 MG/ML
5 INJECTION INTRAMUSCULAR; INTRAVENOUS EVERY 30 MIN PRN
Status: CANCELLED | OUTPATIENT
Start: 2023-10-27

## 2023-10-27 RX ORDER — BUPIVACAINE HCL/EPINEPHRINE 0.25-.0005
VIAL (ML) INJECTION AS NEEDED
Status: DISCONTINUED | OUTPATIENT
Start: 2023-10-27 | End: 2023-10-27 | Stop reason: HOSPADM

## 2023-10-27 RX ORDER — LIDOCAINE HYDROCHLORIDE 20 MG/ML
INJECTION, SOLUTION EPIDURAL; INFILTRATION; INTRACAUDAL; PERINEURAL AS NEEDED
Status: DISCONTINUED | OUTPATIENT
Start: 2023-10-27 | End: 2023-10-27

## 2023-10-27 RX ORDER — PROPOFOL 10 MG/ML
INJECTION, EMULSION INTRAVENOUS CONTINUOUS PRN
Status: DISCONTINUED | OUTPATIENT
Start: 2023-10-27 | End: 2023-10-27

## 2023-10-27 RX ORDER — FENTANYL CITRATE 50 UG/ML
INJECTION, SOLUTION INTRAMUSCULAR; INTRAVENOUS AS NEEDED
Status: DISCONTINUED | OUTPATIENT
Start: 2023-10-27 | End: 2023-10-27

## 2023-10-27 RX ORDER — CEPHALEXIN 500 MG/1
500 CAPSULE ORAL 3 TIMES DAILY
Qty: 3 CAPSULE | Refills: 0 | Status: SHIPPED | OUTPATIENT
Start: 2023-10-27 | End: 2023-10-27 | Stop reason: ENTERED-IN-ERROR

## 2023-10-27 RX ORDER — ALBUTEROL SULFATE 0.83 MG/ML
2.5 SOLUTION RESPIRATORY (INHALATION) ONCE AS NEEDED
Status: CANCELLED | OUTPATIENT
Start: 2023-10-27

## 2023-10-27 RX ORDER — SODIUM CHLORIDE, SODIUM LACTATE, POTASSIUM CHLORIDE, CALCIUM CHLORIDE 600; 310; 30; 20 MG/100ML; MG/100ML; MG/100ML; MG/100ML
100 INJECTION, SOLUTION INTRAVENOUS CONTINUOUS
Status: CANCELLED | OUTPATIENT
Start: 2023-10-27

## 2023-10-27 RX ORDER — OXYCODONE HYDROCHLORIDE 5 MG/1
5 TABLET ORAL 3 TIMES DAILY PRN
Qty: 9 TABLET | Refills: 0 | Status: SHIPPED | OUTPATIENT
Start: 2023-10-27 | End: 2023-10-30

## 2023-10-27 RX ORDER — CEFAZOLIN SODIUM 2 G/100ML
2 INJECTION, SOLUTION INTRAVENOUS ONCE
Status: COMPLETED | OUTPATIENT
Start: 2023-10-27 | End: 2023-10-27

## 2023-10-27 RX ORDER — DOXYCYCLINE 100 MG/1
100 CAPSULE ORAL 2 TIMES DAILY
Qty: 2 CAPSULE | Refills: 0 | Status: SHIPPED | OUTPATIENT
Start: 2023-10-27 | End: 2023-10-27 | Stop reason: ENTERED-IN-ERROR

## 2023-10-27 RX ORDER — SODIUM CHLORIDE, SODIUM LACTATE, POTASSIUM CHLORIDE, CALCIUM CHLORIDE 600; 310; 30; 20 MG/100ML; MG/100ML; MG/100ML; MG/100ML
INJECTION, SOLUTION INTRAVENOUS CONTINUOUS PRN
Status: DISCONTINUED | OUTPATIENT
Start: 2023-10-27 | End: 2023-10-27

## 2023-10-27 RX ADMIN — LIDOCAINE HYDROCHLORIDE 50 MG: 20 INJECTION, SOLUTION EPIDURAL; INFILTRATION; INTRACAUDAL; PERINEURAL at 11:50

## 2023-10-27 RX ADMIN — Medication 100 MCG: at 12:12

## 2023-10-27 RX ADMIN — FENTANYL CITRATE 50 MCG: 50 INJECTION, SOLUTION INTRAMUSCULAR; INTRAVENOUS at 11:37

## 2023-10-27 RX ADMIN — PROPOFOL 100 MCG/KG/MIN: 10 INJECTION, EMULSION INTRAVENOUS at 11:50

## 2023-10-27 RX ADMIN — SODIUM CHLORIDE, SODIUM LACTATE, POTASSIUM CHLORIDE, AND CALCIUM CHLORIDE: .6; .31; .03; .02 INJECTION, SOLUTION INTRAVENOUS at 11:37

## 2023-10-27 RX ADMIN — CEFAZOLIN SODIUM 2 G: 2 INJECTION, SOLUTION INTRAVENOUS at 11:55

## 2023-10-27 RX ADMIN — ONDANSETRON 4 MG: 2 INJECTION INTRAMUSCULAR; INTRAVENOUS at 11:37

## 2023-10-27 RX ADMIN — Medication 100 MCG: at 12:34

## 2023-10-27 ASSESSMENT — PAIN SCALES - GENERAL
PAINLEVEL_OUTOF10: 0 - NO PAIN

## 2023-10-27 ASSESSMENT — PAIN - FUNCTIONAL ASSESSMENT
PAIN_FUNCTIONAL_ASSESSMENT: 0-10

## 2023-10-27 ASSESSMENT — COLUMBIA-SUICIDE SEVERITY RATING SCALE - C-SSRS
6. HAVE YOU EVER DONE ANYTHING, STARTED TO DO ANYTHING, OR PREPARED TO DO ANYTHING TO END YOUR LIFE?: NO
1. IN THE PAST MONTH, HAVE YOU WISHED YOU WERE DEAD OR WISHED YOU COULD GO TO SLEEP AND NOT WAKE UP?: NO
2. HAVE YOU ACTUALLY HAD ANY THOUGHTS OF KILLING YOURSELF?: NO

## 2023-10-27 NOTE — PERIOPERATIVE NURSING NOTE
1545- Patient to Phase II at this time. Report received from PACU RN    1615- Home going instructions and Rx went over with patient and patient's family member. Educated on when to follow up with provider. All questions answered and patient and patient's family member verified understanding verbally.    1620- IV removed at this time with IV catheter tip intact upon removal    1627- Patient transported to [facility] at this time in stable condition and with all belongings via wheelchair.

## 2023-10-27 NOTE — OP NOTE
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 (CMS/HCC) [Z89.431]     Procedures  Insertion Stimulator Lead Spine    Insertion Stimulator Peripheral Nerve Lower Extremity  99542 - [place] INSERTION/RPLCMT PERIPHERAL/GASTRIC NPGR      Surgeons      * Kamaljit Mackey - Primary    Resident/Fellow/Other Assistant:  Surgeon(s) and Role:    Procedure Summary  Anesthesia: Monitor Anesthesia Care  ASA: IV  Anesthesia Staff: Anesthesiologist: Miri House MD  CRNA: LIZZETH Ospina-CRNA; LIZZETH Salinas-CRNA  Estimated Blood Loss: 5mL  Intra-op Medications:   Medication Name Total Dose   BUPivacaine-EPINEPHrine (Marcaine w/EPI) 0.25 %-1:200,000 injection 2.5 mL   lidocaine (Xylocaine) 10 mg/mL (1 %) injection 2.5 mL   ceFAZolin in dextrose (iso-os) (Ancef) IVPB 2 g 2 g              Anesthesia Record               Intraprocedure I/O Totals          Intake    Propofol Drip 0.00 mL    The total shown is the total volume documented since Anesthesia Start was filed.    lactated Ringer's infusion 100.00 mL    Total Intake 100 mL       Output    Urine 0 mL    Est. Blood Loss 3 mL    Total Output 3 mL       Net    Net Volume 97 mL          Specimen: No specimens collected     Staff:   Circulator: Nora Jain RN  Relief Circulator: Renee Sanchez RN  Relief Scrub: Sally Cody  Scrub Person: Joby Benton         Drains and/or Catheters: * None in log *    Tourniquet Times:         Implants:  Implants       Type Name Action Serial No.      Neuro  Interventional Implant KIT, PNS TINED LEAD, 4 CONTACT, 40CM - S0 - [id number] Implanted 0     Neuro Interventional Implant KIT, PNS TINED LEAD, 4 CONTACT, 40CM - S0 - [id number] Implanted 0              Findings: see op note    Indications: Amrik Gould is an 78 y.o. 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  He has had a previous percutaneous peripheral nerve stimulation using the SPRINT device with excellent relief for 2 months.  This was discontinued based on indication of use for 2 months only.  Subsequent to discontinuation, the patient has experienced increasing pain and wanted to have a permanent peripheral nerve stimulator implanted subcutaneously.    The patient was seen in the preoperative area. The risks, benefits, complications, treatment options, non-operative alternatives, expected recovery and outcomes were discussed with the patient. The possibilities of reaction to medication, pulmonary aspiration, injury to surrounding structures, bleeding, recurrent infection, the need for additional procedures, failure to diagnose a condition, and creating a complication requiring transfusion or operation were discussed with the patient. The patient concurred with the proposed plan, giving informed consent.  The site of surgery was properly noted/marked if necessary per policy. The patient has been actively warmed in preoperative area. Preoperative antibiotics have been ordered and given within 1 hours of incision. Venous thrombosis prophylaxis have been ordered including bilateral sequential compression devices    Procedure Details: Following informed consent, the patient was brought to the operating room and placed in the left lateral decubitus position and the beanbag inflated.  The right lower extremity area between the buttocks and lower aspect of the knee was prepped with chlorhexidine and  draped in the usual sterile fashion.  Using ultrasound guidance, the sciatic nerve was identified in the Sub gluteal region as well as in the popliteal region.  A Nalu needle with introducer sheath was placed under ultrasound guidance and close proximity to the nerve.  The needle was removed and the four-contact lead was advanced in close proximity to the nerve.  Motor stimulation did not result in any lower extremity contraction despite multiple attempts in the sublingual region as well as in the popliteal region.  There were 3 small stabs using #5 blade that were used for needle access.  However, despite multiple attempts stimulation could not be obtained.  Stimulation was attempted also using a Stimuplex needle.  However, and despite appropriate impedances motor stimulation could not be obtained in the lower extremity.  At this point in time, and after over 2 hours of attempting to stimulate the nerve, it was decided to abort the procedure.  Complications: Inability to stimulate the sciatic nerve despite multiple ultrasound-guided attempts.  A small hematoma could be felt over the subcuticular region.  Disposition: PACU - hemodynamically stable.  Condition: stable         Additional Details: The case was discussed with the patient and his wife.  They will decide if they would like to try another percutaneous peripheral nerve stimulator before they leave to [place].  Recommended to apply hot compresses over the area of the small hematoma at this of gluteal region.    Attending Attestation: I was present and scrubbed for the entire procedure.    Kamaljit Mackey  Phone Number: [phone]

## 2023-10-27 NOTE — ANESTHESIA PREPROCEDURE EVALUATION
"Patient: Amrik Gould    Procedure Information       Date/Time: 10/27/23 1130    Procedures:       Insertion Stimulator Lead Spine (Right) - Insertion Stimulator Lead Peripheral Nerve Lower Extremity      Insertion Stimulator Peripheral Nerve Lower Extremity (Right) - Insertion Stimulator Peripheral Nerve Lower Extremity    Location: [facility] A OR 07 / Virtual [facility] A OR    Surgeons: Kamaljit Mackey MD PhD                                    Pre-Anesthesia Evaluation    History Of Present Illness  Amrik Gould is a 78 y.o. male who presents for procedure stated above.Endorses no recent changes in health    Medical History  ESRD on HD. 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 Last  cardiac cath in 2018, Echo 10/22 with EF 50-55%. Mild pulmonary HTN.  GERD  KELVIN on BIPAP   HTN   HLD  OA  GOUT      Surgical History  CABG 2002  He has a past surgical history that includes Other surgical history (09/29/2014); Other surgical history (07/31/2019); Lumbar fusion (07/09/2014); and Spine surgery (07/09/2014).     Social History  He reports that he has never smoked. He has never used smokeless tobacco. He reports that he does not drink alcohol and does not use drugs.    Family History  No family history suggestive of malignant hyperthermia or pseudocholinesterase deficiency.      Allergies  NKDA    Last Recorded Vitals  /56   Pulse 65   Temp 36.4 °C (97.5 °F) (Temporal)   Resp 16   Wt 97.5 kg (214 lb 15.2 oz)   SpO2 95%   Ht Readings from Last 1 Encounters:   10/27/23 1.753 m (5' 9\")     Wt Readings from Last 1 Encounters:   10/27/23 97.5 kg (214 lb 15.2 oz)   Body mass index is 31.74 kg/m².      Current Outpatient Medications   Medication Instructions    Accu-Chek Fastclix Lancet Drum misc 3 times daily,  USE TO TEST AS DIRECTED    Accu-Chek Guide test strips strip 3 times daily,  USE TO TEST BLOOD SUGARS AS DIRECTED    allopurinol (ZYLOPRIM) 100 mg, " "oral, Daily    amitriptyline (Elavil) 10 mg tablet 1.5 tablets, oral, Nightly    amitriptyline (Elavil) 25 mg tablet 1 tablet, oral, Nightly    amLODIPine (NORVASC) 5 mg, oral, Daily    aspirin 81 mg, oral, Daily    atorvastatin (LIPITOR) 40 mg, oral    atorvastatin (LIPITOR) 20 mg, oral, Daily    buPROPion XL (Wellbutrin XL) 150 mg 24 hr tablet oral    calcitriol (RocaltroL) 0.5 mcg capsule 1 capsule, oral, Daily    calcitriol (ROCALTROL) 0.25 mcg, oral    carvedilol (COREG) 3.125 mg, oral, 2 times daily    carvedilol (COREG) 12.5 mg, oral    cephalexin (KEFLEX) 500 mg, oral, 3 times daily    cholecalciferol (Vitamin D-3) 10 MCG (400 UNIT) tablet oral    clopidogrel (PLAVIX) 75 mg, oral, Daily    clotrimazole-betamethasone (Lotrisone) cream 1 Application, Topical, 2 times daily, to affected area    doxycycline (MONODOX) 100 mg, oral, 2 times daily, Take with at least 8 ounces (large glass) of water, do not lie down for 30 minutes after    ergocalciferol (VITAMIN D-2) 50,000 Units, oral, Weekly    furosemide (LASIX) 80 mg, oral, Daily    gabapentin (NEURONTIN) 200 mg, oral, 2 times daily    insulin aspart (NOVOLOG FLEXPEN U-100 INSULIN) 6-10 Units, subcutaneous, 3 times daily    insulin detemir (Levemir) 100 unit/mL injection subcutaneous    insulin syringe-needle U-100 (BD Insulin Syringe Ultra-Fine) 31G X 5/16\" 1 mL syringe subcutaneous,  as directed<BR>    Iodosorb 0.25 g, Topical, Daily, TO EACH WOUND ON LEFT FOOT    isosorbide mononitrate ER (IMDUR) 30 mg, oral, Daily    lidocaine (Xylocaine) 5 % ointment Topical    lidocaine-prilocaine (Emla) 2.5-2.5 % cream Topical,  1 HOUR BEFORE PROCEDURE AS DIRECTED.    LORazepam (Ativan) 0.5 mg tablet oral    metoprolol succinate XL (TOPROL-XL) 100 mg, oral, Daily    midodrine (PROAMATINE) 10 mg, oral, 3 times weekly,  Take one tablet on the way to dialysis. Do not take carvedilol before dialysis.<BR>    multivitamin with minerals tablet oral    mupirocin (Bactroban) 2 % " "ointment Use three days before and 2 days after surgery    naloxone (Narcan) 4 mg/0.1 mL nasal spray 0.1 mL, nasal, As needed, FOR ACCIDENTAL OVERDOSE    oxyCODONE (ROXICODONE) 5 mg, oral, 3 times daily PRN    pantoprazole (PROTONIX) 40 mg, oral, Daily    paricalcitol (Zemplar) 1 mcg capsule 1 capsule, oral, Daily    pen needle, diabetic 32 gauge x 5/32\" needle subcutaneous, 4 times daily    pregabalin (Lyrica) 50 mg capsule 1 capsule, oral, 2 times daily    ranolazine (RANEXA) 1,000 mg, oral, 2 times daily    Lesley-Victor [place] Rx 1- mg-mg-mcg tablet 1 tablet, oral, Daily    sertraline (Zoloft) 50 mg tablet oral    sevelamer carbonate (Renvela) 800 mg tablet 3 tablets, oral, 3 times daily with meals    silodosin (Rapaflo) 8 mg capsule oral    sodium polystyrene sulfonate (Kayexalate) powder oral    sorbitol 70 % solution 30 mL, oral, Every 4 hours PRN, until loos BM then prn<BR>    sucralfate (Carafate) 100 mg/mL suspension 10 mL, oral, 2 times daily    topiramate (TOPAMAX) 50 mg, oral,  in addition to regular prescription after each dialysis session<BR>    torsemide (DEMADEX) 100 mg, oral, non dialysis days only    traMADol (ULTRAM) 50 mg, oral, As needed, after each dialysis session    vilazodone (Viibryd) 20 mg tablet oral    Wescaps 1 mg capsule 1 capsule, oral, Daily   Patient is not on Plavix. Last dose of Aspirin was yesterday.   Relevant Results  No results found for: \"STAPHMRSASCR\"  No results found for: \"ABORH\"  Lab Results   Component Value Date    WBC 6.2 10/27/2023    HGB 11.1 (L) 10/27/2023    HCT 34.1 (L) 10/27/2023    PLT 81 (L) 10/27/2023       Chemistry    Lab Results   Component Value Date/Time     10/22/2022 0447    K 4.0 10/22/2022 0447    CL 95 (L) 10/22/2022 0447    CO2 28 10/22/2022 0447    BUN 54 (H) 10/22/2022 0447    CREATININE 6.8 (H) 10/22/2022 0447    Lab Results   Component Value Date/Time    CALCIUM 9.3 10/22/2022 0447    ALKPHOS 70 10/18/2022 0650    AST 25 10/18/2022 0650    ALT " 15 10/18/2022 0650    BILITOT 0.6 10/18/2022 0650          Lab Results   Component Value Date/Time    PROTIME 11.2 10/22/2022 1209    INR 1.0 10/22/2022 1209        Relevant Problems   Cardiovascular   (+) Angina pectoris (CMS/[facility])   (+) Atherosclerotic heart disease of native coronary artery with unspecified angina pectoris (CMS/[facility])   (+) Benign essential hypertension   (+) Left ventricular failure (CMS/[facility])   (+) Mixed hyperlipidemia   (+) Valvular endocarditis      Endocrine   (+) Diabetic peripheral neuropathy (CMS/[facility])   (+) Morbid obesity (CMS/[facility])   (+) Type 2 diabetes mellitus with ulcer (CMS/[facility])      GI   (+) Gastroesophageal reflux disease      /Renal   (+) End stage renal failure on dialysis (CMS/[facility])   (+) Kidney stone   (+) Stage 4 chronic kidney disease (CMS/[facility])      Neuro/Psych   (+) Anxiety   (+) Causalgia of lower extremity, right   (+) Cerebrovascular accident (CMS/[facility])   (+) Diabetic peripheral neuropathy (CMS/[facility])   (+) Lumbosacral radiculitis   (+) Moderate recurrent major depression (CMS/[facility])   (+) Peripheral neuropathy      Pulmonary   (+) Obstructive sleep apnea   (+) Pneumonia      Hematology   (+) Anemia of chronic disease   (+) Thrombocytopenic disorder (CMS/[facility])      Musculoskeletal   (+) Causalgia of lower extremity, right   (+) Disc degeneration, lumbar         Physical Exam    Airway  Mallampati: II  TM distance: >3 FB  Neck ROM: full     Cardiovascular   Rhythm: regular  Rate: normal     Dental   (+) upper dentures, lower dentures     Pulmonary   Breath sounds clear to auscultation     Abdominal            Anesthesia Plan    ASA 4     MAC     intravenous induction   Postoperative administration of opioids is intended.  Anesthetic plan and risks discussed with patient.    Plan discussed with CRNA and CAA.

## 2023-10-27 NOTE — ANESTHESIA PROCEDURE NOTES
Airway  Date/Time: 10/27/2023 12:00 PM  Urgency: elective    Airway not difficult    Staffing  Performed: CRNA   Authorized by: Miri House MD    Performed by: LIZZETH Ospina-LETY  Patient location during procedure: OR    Indications and Patient Condition  Indications for airway management: anesthesia  Spontaneous ventilation: present  Sedation level: moderate (conscious sedation)  Preoxygenated: yes      Final Airway Details  Final airway type: mask

## 2023-10-29 NOTE — ANESTHESIA POSTPROCEDURE EVALUATION
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    Anesthesia Post Evaluation    No notable events documented.

## 2023-11-01 ENCOUNTER — OFFICE VISIT (OUTPATIENT)
Dept: PAIN MEDICINE | Facility: HOSPITAL | Age: 78
End: 2023-11-01
Payer: MEDICARE

## 2023-11-01 DIAGNOSIS — G57.71 CAUSALGIA OF LOWER EXTREMITY, RIGHT: Primary | ICD-10-CM

## 2023-11-01 PROCEDURE — 99213 OFFICE O/P EST LOW 20 MIN: CPT | Performed by: ANESTHESIOLOGY

## 2023-11-01 PROCEDURE — 1159F MED LIST DOCD IN RCRD: CPT | Performed by: ANESTHESIOLOGY

## 2023-11-01 PROCEDURE — 1158F ADVNC CARE PLAN TLK DOCD: CPT | Performed by: ANESTHESIOLOGY

## 2023-11-01 PROCEDURE — 1036F TOBACCO NON-USER: CPT | Performed by: ANESTHESIOLOGY

## 2023-11-01 PROCEDURE — 1125F AMNT PAIN NOTED PAIN PRSNT: CPT | Performed by: ANESTHESIOLOGY

## 2023-11-01 ASSESSMENT — PAIN - FUNCTIONAL ASSESSMENT: PAIN_FUNCTIONAL_ASSESSMENT: 0-10

## 2023-11-01 ASSESSMENT — PAIN SCALES - GENERAL: PAINLEVEL_OUTOF10: 5 - MODERATE PAIN

## 2023-11-01 NOTE — PROGRESS NOTES
Subjective   Patient ID: Amrik Gould is a 78 y.o. male.    HPI  Amrik Gould is a 78-year-old male with a history of causalgia of the right lower extremity status post amputation right foot through metatarsal bone. Patient had previous percutaneous peripheral nerve stimulation using the Sprint device with excellent relief for 2 months. He recently underwent a Nalu stimulator lead into his spine on October 27. Per the patient and wife, the procedure was not successful and he is here presenting for follow-up with pain that is excruciating in the right foot keeping him up at night.      Review of Systems   All other systems reviewed and are negative.      Objective   Physical Exam  Patient is wheelchair-bound.    PHYSICAL EXAM  Vitals signs reviewed  Constitutional:       General: Not in acute distress     Appearance: Normal appearance. Not ill-appearing.  HENT:     Head: Normocephalic and atraumatic  Eyes:     Conjunctiva/sclera: Conjunctivae normal  Cardiovascular:     Rate and Rhythm: Normal rate and regular rhythm  Pulmonary:     Effort: No respiratory distress  Abdominal:     Palpations: Abdomen is soft  Musculoskeletal: GUAJARDO  Skin:     General: Skin is warm and dry. Incision sites clean dry intact with Steri-Strips still intact.  Neurological:     General: No focal deficit present  Psychiatric:         Mood and Affect: Mood normal         Behavior: Behavior normal        Assessment/Plan   Diagnoses and all orders for this visit:  Causalgia of lower extremity, right    Amrik Gould is a 78-year-old male with a history of causalgia of the right lower extremities status post aborted insertion of a Nalu lead permanent stimulator on October 25 due to inability to obtain sciatic nerve stimulation and muscular contraction in the lower leg.  The sciatic nerve could not be stimulated even when using a Stimuplex generator and needle.  Patient states that he is still experiencing excruciating pain in his right foot. We  discussed with the patient that at this time we will request OR time for permanent lead implant with Curonix and we may leave the lead in close proximity to the nerve even if we do not get stimulation.. We discussed at length the risks and benefits of this procedure, especially since the patient and the wife expressed that they will be leaving to [place] in 4 weeks from tomorrow. They understanding the plan and wished to proceed.

## 2023-11-03 ENCOUNTER — PREP FOR PROCEDURE (OUTPATIENT)
Dept: PAIN MEDICINE | Facility: CLINIC | Age: 78
End: 2023-11-03
Payer: MEDICARE

## 2023-11-03 DIAGNOSIS — G57.71 CAUSALGIA OF RIGHT LOWER EXTREMITY: Primary | ICD-10-CM

## 2023-11-15 ENCOUNTER — ANESTHESIA (OUTPATIENT)
Dept: OPERATING ROOM | Facility: HOSPITAL | Age: 78
End: 2023-11-15
Payer: MEDICARE

## 2023-11-15 ENCOUNTER — ANESTHESIA EVENT (OUTPATIENT)
Dept: OPERATING ROOM | Facility: HOSPITAL | Age: 78
End: 2023-11-15
Payer: MEDICARE

## 2023-11-15 ENCOUNTER — APPOINTMENT (OUTPATIENT)
Dept: RADIOLOGY | Facility: HOSPITAL | Age: 78
End: 2023-11-15
Payer: MEDICARE

## 2023-11-15 ENCOUNTER — HOSPITAL ENCOUNTER (OUTPATIENT)
Facility: HOSPITAL | Age: 78
Setting detail: OUTPATIENT SURGERY
Discharge: HOME | End: 2023-11-15
Attending: ANESTHESIOLOGY | Admitting: ANESTHESIOLOGY
Payer: MEDICARE

## 2023-11-15 VITALS
TEMPERATURE: 97.9 F | RESPIRATION RATE: 15 BRPM | HEART RATE: 81 BPM | WEIGHT: 200 LBS | OXYGEN SATURATION: 94 % | SYSTOLIC BLOOD PRESSURE: 156 MMHG | BODY MASS INDEX: 29.53 KG/M2 | DIASTOLIC BLOOD PRESSURE: 63 MMHG

## 2023-11-15 DIAGNOSIS — G62.9 PERIPHERAL NEUROPATHY: Primary | ICD-10-CM

## 2023-11-15 DIAGNOSIS — G57.71 CAUSALGIA OF LOWER EXTREMITY, RIGHT: Primary | ICD-10-CM

## 2023-11-15 DIAGNOSIS — Z89.431 STATUS POST AMPUTATION OF RIGHT FOOT THROUGH METATARSAL BONE (MULTI): ICD-10-CM

## 2023-11-15 LAB — GLUCOSE BLD MANUAL STRIP-MCNC: 101 MG/DL (ref 74–99)

## 2023-11-15 PROCEDURE — 64555 IMPLANT NEUROELECTRODES: CPT | Performed by: ANESTHESIOLOGY

## 2023-11-15 PROCEDURE — 82947 ASSAY GLUCOSE BLOOD QUANT: CPT

## 2023-11-15 PROCEDURE — 2500000004 HC RX 250 GENERAL PHARMACY W/ HCPCS (ALT 636 FOR OP/ED): Performed by: NURSE ANESTHETIST, CERTIFIED REGISTERED

## 2023-11-15 PROCEDURE — 7100000010 HC PHASE TWO TIME - EACH INCREMENTAL 1 MINUTE: Performed by: ANESTHESIOLOGY

## 2023-11-15 PROCEDURE — A64555 PR PERCUT IMPLANT,NEUROELEC,PERIPH NERVE: Performed by: NURSE ANESTHETIST, CERTIFIED REGISTERED

## 2023-11-15 PROCEDURE — 2500000005 HC RX 250 GENERAL PHARMACY W/O HCPCS: Performed by: ANESTHESIOLOGY

## 2023-11-15 PROCEDURE — 3700000002 HC GENERAL ANESTHESIA TIME - EACH INCREMENTAL 1 MINUTE: Performed by: ANESTHESIOLOGY

## 2023-11-15 PROCEDURE — 7100000001 HC RECOVERY ROOM TIME - INITIAL BASE CHARGE: Performed by: ANESTHESIOLOGY

## 2023-11-15 PROCEDURE — 2500000005 HC RX 250 GENERAL PHARMACY W/O HCPCS: Performed by: NURSE ANESTHETIST, CERTIFIED REGISTERED

## 2023-11-15 PROCEDURE — 2780000003 HC OR 278 NO HCPCS: Performed by: ANESTHESIOLOGY

## 2023-11-15 PROCEDURE — 3700000001 HC GENERAL ANESTHESIA TIME - INITIAL BASE CHARGE: Performed by: ANESTHESIOLOGY

## 2023-11-15 PROCEDURE — 2500000004 HC RX 250 GENERAL PHARMACY W/ HCPCS (ALT 636 FOR OP/ED): Performed by: ANESTHESIOLOGY

## 2023-11-15 PROCEDURE — 7100000009 HC PHASE TWO TIME - INITIAL BASE CHARGE: Performed by: ANESTHESIOLOGY

## 2023-11-15 PROCEDURE — 94760 N-INVAS EAR/PLS OXIMETRY 1: CPT

## 2023-11-15 PROCEDURE — A64555 PR PERCUT IMPLANT,NEUROELEC,PERIPH NERVE: Performed by: ANESTHESIOLOGY

## 2023-11-15 PROCEDURE — A4217 STERILE WATER/SALINE, 500 ML: HCPCS | Performed by: ANESTHESIOLOGY

## 2023-11-15 PROCEDURE — 2720000007 HC OR 272 NO HCPCS: Performed by: ANESTHESIOLOGY

## 2023-11-15 PROCEDURE — 95972 ALYS CPLX SP/PN NPGT W/PRGRM: CPT | Performed by: ANESTHESIOLOGY

## 2023-11-15 PROCEDURE — 76942 ECHO GUIDE FOR BIOPSY: CPT | Performed by: ANESTHESIOLOGY

## 2023-11-15 PROCEDURE — 64590 INS/RPL PRPH SAC/GSTR NPG/R: CPT | Performed by: ANESTHESIOLOGY

## 2023-11-15 PROCEDURE — 3600000004 HC OR TIME - INITIAL BASE CHARGE - PROCEDURE LEVEL FOUR: Performed by: ANESTHESIOLOGY

## 2023-11-15 PROCEDURE — 3600000009 HC OR TIME - EACH INCREMENTAL 1 MINUTE - PROCEDURE LEVEL FOUR: Performed by: ANESTHESIOLOGY

## 2023-11-15 PROCEDURE — 7100000002 HC RECOVERY ROOM TIME - EACH INCREMENTAL 1 MINUTE: Performed by: ANESTHESIOLOGY

## 2023-11-15 PROCEDURE — 99100 ANES PT EXTEME AGE<1 YR&>70: CPT | Performed by: ANESTHESIOLOGY

## 2023-11-15 DEVICE — IMPLANTABLE DEVICE: Type: IMPLANTABLE DEVICE | Site: BACK | Status: FUNCTIONAL

## 2023-11-15 RX ORDER — LIDOCAINE HYDROCHLORIDE 10 MG/ML
0.1 INJECTION, SOLUTION EPIDURAL; INFILTRATION; INTRACAUDAL; PERINEURAL ONCE
Status: DISCONTINUED | OUTPATIENT
Start: 2023-11-15 | End: 2023-11-15 | Stop reason: HOSPADM

## 2023-11-15 RX ORDER — PHENYLEPHRINE HCL IN 0.9% NACL 1 MG/10 ML
SYRINGE (ML) INTRAVENOUS AS NEEDED
Status: DISCONTINUED | OUTPATIENT
Start: 2023-11-15 | End: 2023-11-15

## 2023-11-15 RX ORDER — ZINC GLUCONATE 50 MG
50 TABLET ORAL DAILY
COMMUNITY

## 2023-11-15 RX ORDER — FENTANYL CITRATE 50 UG/ML
INJECTION, SOLUTION INTRAMUSCULAR; INTRAVENOUS AS NEEDED
Status: DISCONTINUED | OUTPATIENT
Start: 2023-11-15 | End: 2023-11-15

## 2023-11-15 RX ORDER — PROPOFOL 10 MG/ML
INJECTION, EMULSION INTRAVENOUS AS NEEDED
Status: DISCONTINUED | OUTPATIENT
Start: 2023-11-15 | End: 2023-11-15

## 2023-11-15 RX ORDER — ONDANSETRON HYDROCHLORIDE 2 MG/ML
INJECTION, SOLUTION INTRAVENOUS AS NEEDED
Status: DISCONTINUED | OUTPATIENT
Start: 2023-11-15 | End: 2023-11-15

## 2023-11-15 RX ORDER — CEPHALEXIN 500 MG/1
500 CAPSULE ORAL 3 TIMES DAILY
Qty: 3 CAPSULE | Refills: 0 | Status: SHIPPED | OUTPATIENT
Start: 2023-11-15 | End: 2023-11-16

## 2023-11-15 RX ORDER — LIDOCAINE HYDROCHLORIDE 20 MG/ML
INJECTION, SOLUTION INFILTRATION; PERINEURAL AS NEEDED
Status: DISCONTINUED | OUTPATIENT
Start: 2023-11-15 | End: 2023-11-15

## 2023-11-15 RX ORDER — DEXAMETHASONE SODIUM PHOSPHATE 4 MG/ML
INJECTION, SOLUTION INTRA-ARTICULAR; INTRALESIONAL; INTRAMUSCULAR; INTRAVENOUS; SOFT TISSUE AS NEEDED
Status: DISCONTINUED | OUTPATIENT
Start: 2023-11-15 | End: 2023-11-15

## 2023-11-15 RX ORDER — CEFAZOLIN 1 G/1
INJECTION, POWDER, FOR SOLUTION INTRAVENOUS AS NEEDED
Status: DISCONTINUED | OUTPATIENT
Start: 2023-11-15 | End: 2023-11-15

## 2023-11-15 RX ORDER — BUPIVACAINE HCL/EPINEPHRINE 0.25-.0005
VIAL (ML) INJECTION AS NEEDED
Status: DISCONTINUED | OUTPATIENT
Start: 2023-11-15 | End: 2023-11-15 | Stop reason: HOSPADM

## 2023-11-15 RX ORDER — DOXYCYCLINE 100 MG/1
100 CAPSULE ORAL 2 TIMES DAILY
Qty: 2 CAPSULE | Refills: 0 | Status: SHIPPED | OUTPATIENT
Start: 2023-11-15 | End: 2023-11-16

## 2023-11-15 RX ORDER — SODIUM CHLORIDE 0.9 G/100ML
IRRIGANT IRRIGATION AS NEEDED
Status: DISCONTINUED | OUTPATIENT
Start: 2023-11-15 | End: 2023-11-15 | Stop reason: HOSPADM

## 2023-11-15 RX ORDER — PROPOFOL 10 MG/ML
INJECTION, EMULSION INTRAVENOUS CONTINUOUS PRN
Status: DISCONTINUED | OUTPATIENT
Start: 2023-11-15 | End: 2023-11-15

## 2023-11-15 RX ORDER — SODIUM CHLORIDE, SODIUM LACTATE, POTASSIUM CHLORIDE, CALCIUM CHLORIDE 600; 310; 30; 20 MG/100ML; MG/100ML; MG/100ML; MG/100ML
100 INJECTION, SOLUTION INTRAVENOUS CONTINUOUS
Status: DISCONTINUED | OUTPATIENT
Start: 2023-11-15 | End: 2023-11-15 | Stop reason: HOSPADM

## 2023-11-15 RX ORDER — CINACALCET 30 MG/1
30 TABLET, FILM COATED ORAL DAILY
COMMUNITY

## 2023-11-15 RX ORDER — HYDROGEN PEROXIDE 3 %
20 SOLUTION, NON-ORAL MISCELLANEOUS
COMMUNITY

## 2023-11-15 RX ORDER — MV/FA/DHA/EPA/FISH OIL/SAW/GNK 400MCG-200
COMBINATION PACKAGE (EA) ORAL
COMMUNITY

## 2023-11-15 RX ORDER — UBIDECARENONE 30 MG
30 CAPSULE ORAL DAILY
COMMUNITY

## 2023-11-15 RX ORDER — LANOLIN ALCOHOL/MO/W.PET/CERES
1000 CREAM (GRAM) TOPICAL DAILY
COMMUNITY

## 2023-11-15 RX ORDER — OXYCODONE HYDROCHLORIDE 5 MG/1
5 TABLET ORAL EVERY 6 HOURS PRN
Qty: 9 TABLET | Refills: 0 | Status: SHIPPED | OUTPATIENT
Start: 2023-11-15 | End: 2023-11-18

## 2023-11-15 RX ADMIN — ONDANSETRON 4 MG: 2 INJECTION INTRAMUSCULAR; INTRAVENOUS at 13:10

## 2023-11-15 RX ADMIN — Medication 200 MCG: at 13:30

## 2023-11-15 RX ADMIN — Medication 200 MCG: at 13:26

## 2023-11-15 RX ADMIN — Medication 200 MCG: at 13:49

## 2023-11-15 RX ADMIN — LIDOCAINE HYDROCHLORIDE 50 MG: 20 INJECTION, SOLUTION INFILTRATION; PERINEURAL at 13:10

## 2023-11-15 RX ADMIN — FENTANYL CITRATE 50 MCG: 50 INJECTION, SOLUTION INTRAMUSCULAR; INTRAVENOUS at 14:48

## 2023-11-15 RX ADMIN — PROPOFOL 50 MG: 10 INJECTION, EMULSION INTRAVENOUS at 13:50

## 2023-11-15 RX ADMIN — FENTANYL CITRATE 25 MCG: 50 INJECTION, SOLUTION INTRAMUSCULAR; INTRAVENOUS at 12:55

## 2023-11-15 RX ADMIN — SODIUM CHLORIDE, SODIUM LACTATE, POTASSIUM CHLORIDE, AND CALCIUM CHLORIDE: 600; 310; 30; 20 INJECTION, SOLUTION INTRAVENOUS at 12:50

## 2023-11-15 RX ADMIN — Medication 200 MCG: at 13:43

## 2023-11-15 RX ADMIN — PROPOFOL 50 MG: 10 INJECTION, EMULSION INTRAVENOUS at 13:10

## 2023-11-15 RX ADMIN — DEXAMETHASONE SODIUM PHOSPHATE 4 MG: 4 INJECTION, SOLUTION INTRAMUSCULAR; INTRAVENOUS at 13:10

## 2023-11-15 RX ADMIN — CEFAZOLIN SODIUM 2 G: 1 POWDER, FOR SOLUTION INTRAMUSCULAR; INTRAVENOUS at 13:05

## 2023-11-15 RX ADMIN — FENTANYL CITRATE 25 MCG: 50 INJECTION, SOLUTION INTRAMUSCULAR; INTRAVENOUS at 13:05

## 2023-11-15 RX ADMIN — PROPOFOL 25 MCG/KG/MIN: 10 INJECTION, EMULSION INTRAVENOUS at 13:10

## 2023-11-15 ASSESSMENT — PAIN - FUNCTIONAL ASSESSMENT
PAIN_FUNCTIONAL_ASSESSMENT: 0-10
PAIN_FUNCTIONAL_ASSESSMENT: VAS (VISUAL ANALOG SCALE)
PAIN_FUNCTIONAL_ASSESSMENT: 0-10
PAIN_FUNCTIONAL_ASSESSMENT: 0-10

## 2023-11-15 ASSESSMENT — PAIN SCALES - GENERAL
PAINLEVEL_OUTOF10: 0 - NO PAIN
PAINLEVEL_OUTOF10: 5 - MODERATE PAIN
PAINLEVEL_OUTOF10: 0 - NO PAIN
PAINLEVEL_OUTOF10: 0 - NO PAIN

## 2023-11-15 NOTE — PERIOPERATIVE NURSING NOTE
1516: Phase 2 care begins  1530: Discharge instructions reviewed with pt and wife  1600: IV removed  1602: Pt transported to [facility]

## 2023-11-15 NOTE — OP NOTE
0Right Stimulator Generator Insertion (R), Right Peripheral Lead Nerve Stimulator Insertion (R) Operative Note     Date: 11/15/2023  OR Location: [facility] A OR    Name: Amrik Gould, : 1945, Age: 78 y.o., MRN: [id number], Sex: male    Diagnosis  Pre-op Diagnosis     * Causalgia of right lower extremity [G57.71] Post-op Diagnosis     * Causalgia of right lower extremity [G57.71]     Procedures  Right Stimulator Generator Insertion  27559 - AR INSERTION/RPLCMT PERIPHERAL/GASTRIC NPGR    Right Peripheral Lead Nerve Stimulator Insertion  42702 - AR PRQ IMPLTJ NEUROSTIMULATOR ELTRD PERIPHERAL NRV      Surgeons      * Kamaljit Mackey - Primary    Resident/Fellow/Other Assistant:  Surgeon(s) and Role: MD Pepe Sidhu MD     Procedure Summary  Anesthesia: Monitor Anesthesia Care  ASA: IV  Anesthesia Staff: Anesthesiologist: Baldemar Gregory MD  CRNA: LIZZETH Nolen-LETY, JOHNSON  Estimated Blood Loss: 0mL  Intra-op Medications: * No intraprocedure medications in log *           Anesthesia Record               Intraprocedure I/O Totals          Intake    .00 mL    Propofol Drip 0.00 mL    The total shown is the total volume documented since Anesthesia Start was filed.    Total Intake 400 mL          Specimen: No specimens collected     Staff:   Circulator: Duy Mcfarlane RN  Scrub Person: Kayleen Lau         Drains and/or Catheters: * None in log *    Tourniquet Times:         Implants:  Implants       Type Name Action Serial No.      Implant CURONIX  KIT CHANNEL Implanted [id number]     Implant CURONIX PNS TINED  KIT Implanted [id number]              Findings: see op note    Indications: Amrik Gould is an 78 y.o. 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     The patient was seen in the preoperative area. The risks, benefits, complications, treatment options, non-operative alternatives, expected recovery and outcomes were discussed with the  patient. The possibilities of reaction to medication, pulmonary aspiration, injury to surrounding structures, bleeding, recurrent infection, the need for additional procedures, failure to diagnose a condition, and creating a complication requiring transfusion or operation were discussed with the patient. The patient concurred with the proposed plan, giving informed consent.  The site of surgery was properly noted/marked if necessary per policy. The patient has been actively warmed in preoperative area. Preoperative antibiotics have been ordered and given within 1 hours of incision. Venous thrombosis prophylaxis have been ordered including bilateral sequential compression devices    Procedure Details:   Mr. Amrik Gould is a 78-year-old gentleman with history of causalgia of the right lower extremity status post transmetatarsal amputation presents today for an implant of peripheral nerve stimulator.  He has had a successful 2-month long trial using the SPRINT peripheral nerve stimulator device.  However, the pain recurred as soon as the device was taken out.  An attempt was made at placing the Nalu peripheral nerve stimulator intraoperatively but this was unsuccessful due to poor stimulation.  He returns today to have the Curonix device implanted.     Following appropriate informed consent, the patient was brought to the operating room and placed in the left lateral decubitus position with a beanbag in place.  The skin was prepped with chlorhexidine in the usual sterile fashion and draped sterilely.  2 g of cefazolin were given intravenously.  Using ultrasound guidance, the sciatic nerve was identified between the greater trochanter and the ischial tuberosity.  It was followed distally for a few centimeters.  The skin overlying the putative skin entry point was anesthetized with 1 cc of 0.5% bupivacaine with 1: 200,000 epinephrine.  A #15 blade was used to create an incision of about 1.5 cm long longitudinally.  A  blunt tipped lead introducer was then advanced under ultrasound guidance into close proximity to the sciatic nerve and through this a lead was introduced.  Stimulation through the lead resulted in contraction in the right foot.  Another lead was similar replaced with a similar result through the same incision.  The yellow sheath was then removed and the lead tines were deployed.  The leads were secured in place using 0 Nurolon suture and a drain stitch like fashion with a suture secured through fascia.  Another similar incision was then made about 8 inches distally in the thigh after anesthetizing the skin and subcutaneous tissue with another 2 cc of the same local anesthetic solution and the same 2 yellow sheaths were then used to create a tunnel between both incisions starting from the distal incision toward the proximal incision.  The leads were then tunneled retrograde through the sheaths into the distal incision.  An art was formed at the distal end of the leads and excess lead was cut about a centimeter distal to the knot.  The lead distal to the knot was buried into the fascia using 0 Nurolon sutures and both wounds were then copiously irrigated with a saline solution.  Both wounds were then closed with 1 suture of 0 Vicryl followed by 3-0 Monocryl.  Topical antibiotic ointment and island dressings were then applied and the patient was transferred to the recovery room in stable condition.     The patient and his wife were provided with appropriate postop discharge instructions and a prescription for doxycycline 100 mg twice daily and cephalexin 500 mg 3 times daily for 1 day was provided to the patient at his pharmacy.  Additionally, 9 tablets of oxycodone were called in.  Stimulation will not be started until the patient is seen in the clinic in 1 week.  He was provided with appropriate post operative discharge instructions.  Complications:  None; patient tolerated the procedure well.    Disposition:   home  Condition: stable             Attending Attestation: I was present and scrubbed for the entire procedure.    Kamaljit Mackey  Phone Number: [phone]

## 2023-11-15 NOTE — PROCEDURES
PreOp Diagnosis: Causalgia of the right lower extremity status post transmetatarsal amputation  Post-Procedure Diagnosis: Same as preop diagnosis  Procedure:  1) implant 2 peripheral nerve stimulator leads around the supple gluteal sciatic nerve  2) implant dual lead receivers for the peripheral nerve stimulator in the distal right thigh  3) point-of-care ultrasound  4) complex intraoperative device programming  Anesthesia: Monitored anesthesia care  Surgeon: Kamaljit Mackey MD, PhD   Resident/Fellow/Other Assistant: Bridger Vo MD and Pepe Dhillon MD   Estimated Blood Loss (mL): 0 mL  Specimen: no      Operative Note:  Mr. Amrik Gould is a 78-year-old gentleman with history of causalgia of the right lower extremity status post transmetatarsal amputation presents today for an implant of peripheral nerve stimulator.  He has had a successful 2-month long trial using the SPRINT peripheral nerve stimulator device.  However, the pain recurred as soon as the device was taken out.  An attempt was made at placing the Nalu peripheral nerve stimulator intraoperatively but this was unsuccessful due to poor stimulation.  He returns today to have the Curonix device implanted.    Following appropriate informed consent, the patient was brought to the operating room and placed in the left lateral decubitus position with a beanbag in place.  The skin was prepped with chlorhexidine in the usual sterile fashion and draped sterilely.  2 g of cefazolin were given intravenously.  Using ultrasound guidance, the sciatic nerve was identified between the greater trochanter and the ischial tuberosity.  It was followed distally for a few centimeters.  The skin overlying the putative skin entry point was anesthetized with 1 cc of 0.5% bupivacaine with 1: 200,000 epinephrine.  A #15 blade was used to create an incision of about 1.5 cm long longitudinally.  A blunt tipped lead introducer was then advanced under ultrasound guidance into  close proximity to the sciatic nerve and through this a lead was introduced.  Stimulation through the lead resulted in contraction in the right foot.  Another lead was similar replaced with a similar result through the same incision.  The yellow sheath was then removed and the lead tines were deployed.  The leads were secured in place using 0 Nurolon suture and a drain stitch like fashion with a suture secured through fascia.  Another similar incision was then made about 8 inches distally in the thigh after anesthetizing the skin and subcutaneous tissue with another 2 cc of the same local anesthetic solution and the same 2 yellow sheaths were then used to create a tunnel between both incisions starting from the distal incision toward the proximal incision.  The leads were then tunneled retrograde through the sheaths into the distal incision.  An art was formed at the distal end of the leads and excess lead was cut about a centimeter distal to the knot.  The lead distal to the knot was buried into the fascia using 0 Nurolon sutures and both wounds were then copiously irrigated with a saline solution.  Both wounds were then closed with 1 suture of 0 Vicryl followed by 3-0 Monocryl.  Topical antibiotic ointment and island dressings were then applied and the patient was transferred to the recovery room in stable condition.    The patient and his wife were provided with appropriate postop discharge instructions and a prescription for doxycycline 100 mg twice daily and cephalexin 500 mg 3 times daily for 1 day was provided to the patient at his pharmacy.  Additionally, 9 tablets of oxycodone were called in.  Stimulation will not be started until the patient is seen in the clinic in 1 week.  He was provided with appropriate post operative discharge instructions.

## 2023-11-15 NOTE — DISCHARGE INSTRUCTIONS
Post-surgery instructions:    Blood thinners: You may resume them tomorrow evening at the earliest if applicable    Antibiotics: Take cephalexin and doxycycline as directed for one day to prevent infection. We will send this to your pharmacy. for one week to prevent infection. We will send this to your pharmacy.     Pain Medications: These have been sent to your pharmacy. You will be getting 5 pills for post-surgery pain. You will not be getting a refill, so don't lose them.     Activity: Avoid strenuous activity until the follow up appointment. After that return to your normal activity level.     Bandages: Remove the outer bandage after 48 hours and expose the incision to air.    Showering/Bathing: You may shower after bandage is removed in 48 hours    Follow up: With Dr. Mackey in clinic in one week to discuss how you are doing    After hours, call the   ([phone]) and ask for the pain management answering service if you notice any of the below:     Call the doctor immediately: if you notice:    Excessive bleeding from procedure site (brisk bright red bleeding from the site or bleeding that soaks the bandages or does not stop)   Severe headache  Inability to walk, leg or arm weakness or numbness that is significantly worse after the procedure   Uncontrolled pain   Inability to control your bowels or bladder   Signs of infection: Fever above 101.5F, redness, swelling, pus or drainage from the site

## 2023-11-15 NOTE — PERIOPERATIVE NURSING NOTE
2107 Patient arrived to [place] after procedure with Anesthesia and procedure RN, procedure discussed, plan reviewed, VSS    1500 pt tolerating kristian carballo and ginger ale, pt wife at bedside    2685 Phase 2

## 2023-11-15 NOTE — H&P
History Of Present Illness  Amrik Gould is a 78 y.o. male presents for procedure state below. Endorses no changes in past medical history or medical health since last seen in clinic.     Past Medical History  He has a past medical history of Obstructive sleep apnea (adult) (pediatric) (07/09/2014), Other conditions influencing health status, Other conditions influencing health status, Personal history of other diseases of the circulatory system, Personal history of other diseases of the digestive system, Personal history of other diseases of the musculoskeletal system and connective tissue, Personal history of other diseases of the nervous system and sense organs, Personal history of other diseases of urinary system, Personal history of other endocrine, nutritional and metabolic disease, Personal history of other endocrine, nutritional and metabolic disease, and Stroke (CMS/[facility]).    Surgical History  He has a past surgical history that includes Other surgical history (09/29/2014); Other surgical history (07/31/2019); Lumbar fusion (07/09/2014); Spine surgery (07/09/2014); Coronary artery bypass graft; Coronary stent placement; Cataract extraction; Vascular surgery; Cholecystectomy; and Knee arthroscopy w/ debridement (Bilateral).     Social History  He reports that he has never smoked. He has never used smokeless tobacco. He reports that he does not drink alcohol and does not use drugs.    Family History  Family History   Family history unknown: Yes        Allergies  Patient has no known allergies.    Review of Symptoms:   Constitutional: Negative for chills, diaphoresis or fever  HENT: Negative for neck swelling  Eyes:.  Negative for eye pain  Respiratory:.  Negative for cough, shortness of breath or wheezing    Cardiovascular:.  Negative for chest pain or palpitations  Gastrointestinal:.  Negative for abdominal pain, nausea and vomiting  Genitourinary:.  Negative for urgency  Musculoskeletal:  Positive for back  pain. Positive for joint pain. Denies falls within the past 3 months.  Skin: Negative for wounds or itching   Neurological: Negative for dizziness, seizures, loss of consciousness and weakness  Endo/Heme/Allergies: Does not bruise/bleed easily  Psychiatric/Behavioral: Negative for depression. The patient does not appear anxious.       PHYSICAL EXAM  Vitals signs reviewed  Constitutional:       General: Not in acute distress     Appearance: Normal appearance. Not ill-appearing.  HENT:     Head: Normocephalic and atraumatic  Eyes:     Conjunctiva/sclera: Conjunctivae normal  Cardiovascular:     Rate and Rhythm: Normal rate and regular rhythm  Pulmonary:     Effort: No respiratory distress  Abdominal:     Palpations: Abdomen is soft  Musculoskeletal: GUAJARDO  Skin:     General: Skin is warm and dry  Neurological:     General: No focal deficit present  Psychiatric:         Mood and Affect: Mood normal         Behavior: Behavior normal     Last Recorded Vitals  /50   Pulse 57   Temp 36 °C (96.8 °F) (Temporal)   Resp 18   Wt 90.7 kg (200 lb)   SpO2 96%     Relevant Results  Current Outpatient Medications   Medication Instructions    Accu-Chek Fastclix Lancet Drum misc 3 times daily,  USE TO TEST AS DIRECTED    Accu-Chek Guide test strips strip 3 times daily,  USE TO TEST BLOOD SUGARS AS DIRECTED    allopurinol (ZYLOPRIM) 100 mg, oral, Daily    ascorbic acid (VITAMIN C) 1,000 mg, oral, Daily    aspirin 81 mg, oral, Daily    atorvastatin (LIPITOR) 40 mg, oral    atorvastatin (LIPITOR) 20 mg, oral, Daily    carvedilol (COREG) 3.125 mg, oral, 2 times daily    carvedilol (COREG) 3.125 mg, oral, 2 times daily with meals    cholecalciferol (Vitamin D-3) 10 MCG (400 UNIT) tablet oral    cinacalcet (SENSIPAR) 30 mg, oral, Daily, Take with food or shortly afer a meal. Swallow tablet whole; do not break or divide.    clotrimazole-betamethasone (Lotrisone) cream 1 Application, Topical, 2 times daily, to affected area     co-enzyme Q-10 30 mg, oral, Daily    ergocalciferol (VITAMIN D-2) 50,000 Units, oral, Weekly    esomeprazole (NEXIUM) 20 mg, oral, Daily before breakfast, Do not open capsule.    gabapentin (NEURONTIN) 200 mg, oral, 2 times daily    krill oil 500 mg capsule oral    midodrine (PROAMATINE) 10 mg, oral, 3 times weekly,  Take one tablet on the way to dialysis. Do not take carvedilol before dialysis.<BR>    mupirocin (Bactroban) 2 % ointment Use three days before and 2 days after surgery    naloxone (Narcan) 4 mg/0.1 mL nasal spray 0.1 mL, nasal, As needed, FOR ACCIDENTAL OVERDOSE    pantoprazole (PROTONIX) 40 mg, oral, Daily    Lesley-[facility] Rx 1- mg-mg-mcg tablet 1 tablet, oral, Daily    sertraline (Zoloft) 50 mg tablet oral    sevelamer carbonate (Renvela) 800 mg tablet 3 tablets, oral, 3 times daily with meals    sorbitol 70 % solution 30 mL, oral, Every 4 hours PRN, until loos BM then prn<BR>    topiramate (TOPAMAX) 50 mg, oral,  in addition to regular prescription after each dialysis session<BR>    traMADol (ULTRAM) 50 mg, oral, As needed, after each dialysis session    zinc gluconate 50 mg tablet 50 mg of elemental zinc, oral, Daily       No results found for this or any previous visit from the past 1000 days.     No image results found.     1. Peripheral neuropathy  FL less than 1 hour    FL less than 1 hour           ASSESSMENT/PLAN  Amrik Gould is a 78 y.o. male presents for Sciatic PNS, Curonix     Our plan is as follows:  - Follow In pain clinic  - Continue to participate in physical therapy as well as physician directed home exercises  - Continue pain medications as prescribed       Pepe Dhillon MD

## 2023-11-15 NOTE — ANESTHESIA PREPROCEDURE EVALUATION
Patient: Amrik Gould    Procedure Information       Date/Time: 11/15/23 1230    Procedures:       Right Stimulator Generator Insertion (Right)      Right Peripheral Lead Nerve Stimulator Insertion (Right)    Location: [facility] A OR 04 / [facility] A OR    Surgeons: Kamaljit Mackey MD PhD            Relevant Problems   Anesthesia (within normal limits)      Cardiovascular   (+) Angina pectoris (CMS/HCC)   (+) Atherosclerotic heart disease of native coronary artery with unspecified angina pectoris (CMS/HCC)   (+) Benign essential hypertension   (+) Left ventricular failure (CMS/HCC)   (+) Mixed hyperlipidemia   (+) Valvular endocarditis      Endocrine   (+) Diabetic peripheral neuropathy (CMS/HCC)   (+) Morbid obesity (CMS/HCC)   (+) Type 2 diabetes mellitus with ulcer (CMS/HCC)      GI   (+) Gastroesophageal reflux disease      /Renal   (+) End stage renal failure on dialysis (CMS/[facility])   (+) Kidney stone   (+) Stage 4 chronic kidney disease (CMS/HCC)      Neuro/Psych   (+) Anxiety   (+) Causalgia of lower extremity, right   (+) Causalgia of right lower extremity   (+) Cerebrovascular accident (CMS/HCC)   (+) Diabetic peripheral neuropathy (CMS/HCC)   (+) Lumbosacral radiculitis   (+) Moderate recurrent major depression (CMS/[facility])   (+) Peripheral neuropathy      Pulmonary   (+) Obstructive sleep apnea   (+) Pneumonia      Hematology   (+) Anemia of chronic disease   (+) Thrombocytopenic disorder (CMS/HCC)      Musculoskeletal   (+) Causalgia of lower extremity, right   (+) Causalgia of right lower extremity   (+) Disc degeneration, lumbar       Clinical information reviewed:   Tobacco  Allergies    Med Hx  Surg Hx   Fam Hx  Soc Hx        NPO Detail:  NPO/Void Status  Carbonhydrate Drink Given Prior to Surgery? : N  Date of Last Liquid: 11/15/23  Time of Last Liquid: 0830  Date of Last Solid: 11/14/23  Time of Last Solid: 1700         Physical Exam    Airway  Mallampati: II  TM distance: >3 FB  Neck ROM: full      Cardiovascular   Rhythm: regular     Dental   (+) upper dentures, lower dentures     Pulmonary   Breath sounds clear to auscultation     Abdominal   Abdomen: soft  Bowel sounds: normal           Anesthesia Plan    ASA 4     MAC and general     intravenous induction   Anesthetic plan and risks discussed with patient.    Plan discussed with CRNA.    Neda Parikh APRN-CRNA, DNP

## 2023-11-16 ASSESSMENT — PAIN SCALES - GENERAL
PAINLEVEL_OUTOF10: 3
PAIN_LEVEL: 2

## 2023-11-16 NOTE — ANESTHESIA POSTPROCEDURE EVALUATION
Patient: Amrik Gould    Procedure Summary       Date: 11/15/23 Room / Location: [facility] A OR 04 / [facility] A OR    Anesthesia Start: 1250 Anesthesia Stop: 1500    Procedures:       Right Stimulator Generator Insertion (Right)      Right Peripheral Lead Nerve Stimulator Insertion (Right) Diagnosis:       Causalgia of right lower extremity      (Causalgia of right lower extremity [G57.71])    Surgeons: Kamaljit Mackey MD PhD Responsible Provider: Baldemar Gregory MD    Anesthesia Type: MAC, general ASA Status: 4            Anesthesia Type: MAC, general    Vitals Value Taken Time   /63 11/15/23 1516   Temp 36.6 °C (97.9 °F) 11/15/23 1455   Pulse 81 11/15/23 1516   Resp 15 11/15/23 1516   SpO2 94 % 11/15/23 1516       Anesthesia Post Evaluation    Patient location during evaluation: PACU  Patient participation: complete - patient participated  Level of consciousness: awake  Pain score: 2  Pain management: adequate  Airway patency: patent  Cardiovascular status: acceptable  Respiratory status: acceptable  Hydration status: acceptable  Postoperative Nausea and Vomiting: none        There were no known notable events for this encounter.

## 2023-11-27 ENCOUNTER — OFFICE VISIT (OUTPATIENT)
Dept: PAIN MEDICINE | Facility: HOSPITAL | Age: 78
End: 2023-11-27
Payer: MEDICARE

## 2023-11-27 DIAGNOSIS — G57.71 CAUSALGIA OF RIGHT LOWER EXTREMITY: Primary | ICD-10-CM

## 2023-11-27 PROCEDURE — 1036F TOBACCO NON-USER: CPT | Performed by: ANESTHESIOLOGY

## 2023-11-27 PROCEDURE — 95972 ALYS CPLX SP/PN NPGT W/PRGRM: CPT | Performed by: ANESTHESIOLOGY

## 2023-11-27 PROCEDURE — 1158F ADVNC CARE PLAN TLK DOCD: CPT | Performed by: ANESTHESIOLOGY

## 2023-11-27 PROCEDURE — 1159F MED LIST DOCD IN RCRD: CPT | Performed by: ANESTHESIOLOGY

## 2023-11-27 PROCEDURE — 1125F AMNT PAIN NOTED PAIN PRSNT: CPT | Performed by: ANESTHESIOLOGY

## 2023-11-27 ASSESSMENT — ENCOUNTER SYMPTOMS
CARDIOVASCULAR NEGATIVE: 1
GASTROINTESTINAL NEGATIVE: 1
CONSTITUTIONAL NEGATIVE: 1
HEMATOLOGIC/LYMPHATIC NEGATIVE: 1
RESPIRATORY NEGATIVE: 1
NEUROLOGICAL NEGATIVE: 1

## 2023-11-27 NOTE — PROGRESS NOTES
Subjective   Patient ID: Amrik Gould is a 78 y.o. male.    HPI  The patient is a 78 YOM FUV w/ PMHx RLE causalgia s/p transmetatarsal amputation who presents to the clinic following a Curonix R peripheral nerve stim placement 11/15/2023.     Per patient, he had significant relief in sxs >50% with the trial, has had severe 9-10/10 pain RLE over the past week since implantation awaiting activation. Device rep currently in the room calibrating device. Since implantation he denies any subjective fevers/chills, erythema, swelling, pain over the incision site.      Review of Systems   Constitutional: Negative.    Respiratory: Negative.     Cardiovascular: Negative.    Gastrointestinal: Negative.    Genitourinary: Negative.    Musculoskeletal:         RLE pain   Skin: Negative.    Neurological: Negative.    Hematological: Negative.        Objective   Physical Exam  Constitutional:       Appearance: Normal appearance.   Cardiovascular:      Rate and Rhythm: Normal rate and regular rhythm.   Pulmonary:      Effort: Pulmonary effort is normal.      Breath sounds: Normal breath sounds.   Abdominal:      General: Abdomen is flat.      Palpations: Abdomen is soft.   Musculoskeletal:         General: Normal range of motion.      Cervical back: Normal range of motion.   Skin:     General: Skin is warm and dry.   Neurological:      General: No focal deficit present.      Mental Status: He is alert and oriented to person, place, and time.         Assessment/Plan   The patient is a 78 YOM FUV w/ PMHx RLE causalgia s/p transmetatarsal amputation who presents to the clinic following a Curonix R peripheral nerve stim placement 11/15/2023. Device rep currently in the room calibrating device. Since implantation he denies any subjective fevers/chills, erythema, swelling, pain over the incision site.     - Patient instructed to follow up as needed    Patient seen and discussed with Dr. Mackey.

## 2024-05-01 ENCOUNTER — OFFICE VISIT (OUTPATIENT)
Dept: PAIN MEDICINE | Facility: HOSPITAL | Age: 79
End: 2024-05-01
Payer: MEDICARE

## 2024-05-01 ENCOUNTER — PREP FOR PROCEDURE (OUTPATIENT)
Dept: ANESTHESIOLOGY | Facility: HOSPITAL | Age: 79
End: 2024-05-01

## 2024-05-01 DIAGNOSIS — M19.011 LOCALIZED OSTEOARTHRITIS OF RIGHT SHOULDER: Primary | ICD-10-CM

## 2024-05-01 DIAGNOSIS — G57.71 CAUSALGIA OF RIGHT LOWER EXTREMITY: Primary | ICD-10-CM

## 2024-05-01 DIAGNOSIS — Z89.431 STATUS POST AMPUTATION OF RIGHT FOOT THROUGH METATARSAL BONE (MULTI): ICD-10-CM

## 2024-05-01 DIAGNOSIS — G57.71 CAUSALGIA OF LOWER EXTREMITY, RIGHT: ICD-10-CM

## 2024-05-01 PROCEDURE — 20610 DRAIN/INJ JOINT/BURSA W/O US: CPT | Performed by: ANESTHESIOLOGY

## 2024-05-01 PROCEDURE — 99213 OFFICE O/P EST LOW 20 MIN: CPT | Performed by: ANESTHESIOLOGY

## 2024-05-01 PROCEDURE — 20610 DRAIN/INJ JOINT/BURSA W/O US: CPT | Performed by: STUDENT IN AN ORGANIZED HEALTH CARE EDUCATION/TRAINING PROGRAM

## 2024-05-01 PROCEDURE — 20610 DRAIN/INJ JOINT/BURSA W/O US: CPT | Mod: RT | Performed by: ANESTHESIOLOGY

## 2024-05-01 NOTE — PROGRESS NOTES
History Of Present Illness  Amrik Gould is a 78 y.o. male presenting as a follow-up visit in pain clinic.  Patient had gone to [place] for 6 months after his initial peripheral nerve stimulator implant with Curonix for his right sciatic nerve.  Unfortunately, patient had developed tenderness and swelling with some mild pustular discharge at both of his incision sites on his right thigh concerning for soft tissue infection.  Otherwise, he says that his neuropathy has been nearly completely controlled with the implant.  He has been started on dual antibiotic therapy at the time of his dialysis through [place] [facility].  He is supposed to continue on this therapy for 6 weeks.    Review of Systems   13 point ROS done and negative except for the above.     Physical Exam  PHYSICAL EXAM  Vitals signs reviewed  Constitutional:    General: Not in acute distress   Appearance: Normal appearance. Not ill-appearing.  HENT:   Head: Normocephalic and atraumatic  Eyes:   Conjunctiva/sclera normal  Cardiovascular:  No jugular venous distention bilaterally  No gross edema in lower extremities  Pulmonary:   Effort: No respiratory distress  Abdominal:  Abdomen appears nondistended  Musculoskeletal:   Moves all extremities equally  Skin:  2 cm incision sites that have small amount of fluctuance, erythema and trace amount of white-tinged pustular discharge.  No other significant swelling in the leg nor tracking of erythema in the quad or hamstring.  Neurological:   General: No focal deficit present  Psychiatric:    Mood and Affect: Mood normal    Behavior: Behavior normal    Last Recorded Vitals  There were no vitals taken for this visit.    Relevant Results        ASSESSMENT:  Assessment/Plan   Problem List Items Addressed This Visit    None    Joint Injection/Aspiration    Date/Time: 5/1/2024 2:19 PM    Performed by: Bridger Vo MD  Authorized by: Kamaljit Mackey MD PhD    Consent:     Consent obtained:   Verbal    Consent given by:  Patient and spouse    Risks, benefits, and alternatives were discussed: yes      Risks discussed:  Bleeding, infection and pain    Alternatives discussed:  No treatment  Universal protocol:     Patient identity confirmed:  Verbally with patient  Post-procedure details:     Dressing:  Adhesive bandage    Procedure completion:  Tolerated well, no immediate complications  Comments:      The procedure risks, benefits, alternatives were reviewed with the patient. Informed verbal consent was obtained. The patient had their right shoulder exposed and prepped using chlorhexidine. After negative aspiration using a posterior-superior approach,  4cc of 0.25% Bupivacaine mixed with 1 cc of 40 mg of methylprednisolone was injected in the glenohumeral joint.  Needle removed, bandage applied, tolerated procedure well with no immediate complications.        Patient is a 78-year-old male who is status post right sciatic nerve Curonix peripheral nerve stimulator implant, with exam as above concerning for soft tissue infection of incision sites.  Patient currently on IV racz administered during dialysis, Tuesday, Thursday, Saturday.  Unfortunately patient had received near complete resolution of his pain with the stimulator, however will schedule for lead explantation as soon as possible.  Patient otherwise denies any significant fevers or other systemic signs.    PLAN:  - Right glenohumeral shoulder injection as above  - Right lower extremity peripheral nerve stimulator explantation in 2 days.  Risks, benefits and alternatives of the procedure were discussed with the patient who expressed understanding and agrees to proceed.  - Will schedule for right-sided Sprint percutaneous peripheral nerve stimulator after explant if needed given satisfactory control of pain with neurostimulation.      The plan was discussed with the patient and they were invited to contact us back anytime with any questions or  concerns and follow-up with us in the office as needed.    Bridger Vo MD

## 2024-05-02 NOTE — H&P
History Of Present Illness  Amrik Gould is a 78 y.o. male presenting with right lower extremity peripheral nerve stimulator soft tissue infection.     Past Medical History  Past Medical History:   Diagnosis Date    Obstructive sleep apnea (adult) (pediatric) 07/09/2014    Obstructive sleep apnea    Other conditions influencing health status     Osteoarthritis    Other conditions influencing health status     Coronary Artery Disease    Personal history of other diseases of the circulatory system     History of hypertension    Personal history of other diseases of the digestive system     History of esophageal reflux    Personal history of other diseases of the musculoskeletal system and connective tissue     Personal history of gout    Personal history of other diseases of the nervous system and sense organs     History of cataract    Personal history of other diseases of urinary system     History of chronic kidney disease    Personal history of other endocrine, nutritional and metabolic disease     History of diabetes mellitus    Personal history of other endocrine, nutritional and metabolic disease     History of hyperlipidemia    Stroke (Multi)        Surgical History  Past Surgical History:   Procedure Laterality Date    CATARACT EXTRACTION      CHOLECYSTECTOMY      CORONARY ARTERY BYPASS GRAFT      CORONARY STENT PLACEMENT      KNEE ARTHROSCOPY W/ DEBRIDEMENT Bilateral     LUMBAR FUSION  07/09/2014    Lumbar Vertebral Fusion    OTHER SURGICAL HISTORY  09/29/2014    Neurological [place]/Pump Implantation Of Device Drug Inf    OTHER SURGICAL HISTORY  07/31/2019    Cataract surgery    SPINE SURGERY  07/09/2014    Spine Repair    VASCULAR SURGERY      left leg stent placement        Social History  He reports that he has never smoked. He has never used smokeless tobacco. He reports that he does not drink alcohol and does not use drugs.    Family History  Family History   Family history unknown: Yes         Allergies  Patient has no known allergies.    Review of Systems   13 point ROS done and negative except for the above.   Physical Exam     General: NAD, well nourished   Eyes: Non-icteric sclera, EOMI  Ears, Nose, Mouth, and Throat: External ears and nose appear to be without deformity or rash. No lesions or masses noted. Hearing is grossly intact.   Neck: Trachea midline  Respiratory: Nonlabored breathing   Cardiovascular: No JVD  Skin: No rashes or open lesions/ulcers identified on skin.    Last Recorded Vitals  There were no vitals taken for this visit.    Relevant Results           Assessment/Plan   Problem List Items Addressed This Visit    None      Patient has past medical history significant for end-stage renal disease on (on HD Tu/Th/Sa), diabetes (A1c 5.7%), thrombocytopenia (last platelet in October 2023 86), who presents for peripheral nerve stimulator explantation secondary to soft tissue infection.  Patient is getting vancomycin with his dialysis.    Risks, benefits, alternatives to the procedure were discussed in detail and patient was amenable to proceeding.    Bridger Vo MD

## 2024-05-03 ENCOUNTER — ANESTHESIA EVENT (OUTPATIENT)
Dept: OPERATING ROOM | Facility: HOSPITAL | Age: 79
End: 2024-05-03
Payer: MEDICARE

## 2024-05-03 ENCOUNTER — HOSPITAL ENCOUNTER (OUTPATIENT)
Facility: HOSPITAL | Age: 79
Setting detail: OUTPATIENT SURGERY
Discharge: HOME | End: 2024-05-03
Attending: ANESTHESIOLOGY | Admitting: ANESTHESIOLOGY
Payer: MEDICARE

## 2024-05-03 ENCOUNTER — APPOINTMENT (OUTPATIENT)
Dept: RADIOLOGY | Facility: HOSPITAL | Age: 79
End: 2024-05-03
Payer: MEDICARE

## 2024-05-03 ENCOUNTER — ANESTHESIA (OUTPATIENT)
Dept: OPERATING ROOM | Facility: HOSPITAL | Age: 79
End: 2024-05-03
Payer: MEDICARE

## 2024-05-03 VITALS
HEART RATE: 71 BPM | OXYGEN SATURATION: 100 % | WEIGHT: 203.04 LBS | DIASTOLIC BLOOD PRESSURE: 88 MMHG | SYSTOLIC BLOOD PRESSURE: 136 MMHG | TEMPERATURE: 97.5 F | BODY MASS INDEX: 30.07 KG/M2 | HEIGHT: 69 IN | RESPIRATION RATE: 13 BRPM

## 2024-05-03 DIAGNOSIS — G57.71 CAUSALGIA OF RIGHT LOWER EXTREMITY: ICD-10-CM

## 2024-05-03 DIAGNOSIS — G57.71 CAUSALGIA OF RIGHT LOWER EXTREMITY: Primary | ICD-10-CM

## 2024-05-03 LAB — GLUCOSE BLD MANUAL STRIP-MCNC: 97 MG/DL (ref 74–99)

## 2024-05-03 PROCEDURE — 7100000001 HC RECOVERY ROOM TIME - INITIAL BASE CHARGE: Performed by: ANESTHESIOLOGY

## 2024-05-03 PROCEDURE — 2720000007 HC OR 272 NO HCPCS: Performed by: ANESTHESIOLOGY

## 2024-05-03 PROCEDURE — 82947 ASSAY GLUCOSE BLOOD QUANT: CPT

## 2024-05-03 PROCEDURE — 7100000009 HC PHASE TWO TIME - INITIAL BASE CHARGE: Performed by: ANESTHESIOLOGY

## 2024-05-03 PROCEDURE — 76000 FLUOROSCOPY <1 HR PHYS/QHP: CPT | Mod: 59

## 2024-05-03 PROCEDURE — 64585 REV/RMV PERPH NSTIM ELTRD RA: CPT | Performed by: ANESTHESIOLOGY

## 2024-05-03 PROCEDURE — 3700000001 HC GENERAL ANESTHESIA TIME - INITIAL BASE CHARGE: Performed by: ANESTHESIOLOGY

## 2024-05-03 PROCEDURE — 3700000002 HC GENERAL ANESTHESIA TIME - EACH INCREMENTAL 1 MINUTE: Performed by: ANESTHESIOLOGY

## 2024-05-03 PROCEDURE — 7100000010 HC PHASE TWO TIME - EACH INCREMENTAL 1 MINUTE: Performed by: ANESTHESIOLOGY

## 2024-05-03 PROCEDURE — A64585 PR REVISE/REMOVE PERIPHERAL NEUROELECTRODE: Performed by: ANESTHESIOLOGIST ASSISTANT

## 2024-05-03 PROCEDURE — 77003 FLUOROGUIDE FOR SPINE INJECT: CPT | Performed by: ANESTHESIOLOGY

## 2024-05-03 PROCEDURE — 3600000003 HC OR TIME - INITIAL BASE CHARGE - PROCEDURE LEVEL THREE: Performed by: ANESTHESIOLOGY

## 2024-05-03 PROCEDURE — 2500000004 HC RX 250 GENERAL PHARMACY W/ HCPCS (ALT 636 FOR OP/ED): Performed by: ANESTHESIOLOGIST ASSISTANT

## 2024-05-03 PROCEDURE — 87102 FUNGUS ISOLATION CULTURE: CPT | Mod: AHULAB | Performed by: ANESTHESIOLOGY

## 2024-05-03 PROCEDURE — 2500000005 HC RX 250 GENERAL PHARMACY W/O HCPCS: Performed by: ANESTHESIOLOGIST ASSISTANT

## 2024-05-03 PROCEDURE — 3600000008 HC OR TIME - EACH INCREMENTAL 1 MINUTE - PROCEDURE LEVEL THREE: Performed by: ANESTHESIOLOGY

## 2024-05-03 PROCEDURE — 2500000005 HC RX 250 GENERAL PHARMACY W/O HCPCS: Performed by: ANESTHESIOLOGY

## 2024-05-03 PROCEDURE — 87070 CULTURE OTHR SPECIMN AEROBIC: CPT | Mod: 91,AHULAB | Performed by: ANESTHESIOLOGY

## 2024-05-03 PROCEDURE — A64585 PR REVISE/REMOVE PERIPHERAL NEUROELECTRODE: Performed by: ANESTHESIOLOGY

## 2024-05-03 PROCEDURE — 99100 ANES PT EXTEME AGE<1 YR&>70: CPT | Performed by: ANESTHESIOLOGY

## 2024-05-03 PROCEDURE — 7100000002 HC RECOVERY ROOM TIME - EACH INCREMENTAL 1 MINUTE: Performed by: ANESTHESIOLOGY

## 2024-05-03 PROCEDURE — 64595 REV/RMV PRPH SAC/GSTR NPG/R: CPT | Performed by: ANESTHESIOLOGY

## 2024-05-03 PROCEDURE — 2500000004 HC RX 250 GENERAL PHARMACY W/ HCPCS (ALT 636 FOR OP/ED): Performed by: ANESTHESIOLOGY

## 2024-05-03 RX ORDER — VANCOMYCIN HYDROCHLORIDE 1 G/20ML
INJECTION, POWDER, LYOPHILIZED, FOR SOLUTION INTRAVENOUS AS NEEDED
Status: DISCONTINUED | OUTPATIENT
Start: 2024-05-03 | End: 2024-05-03 | Stop reason: HOSPADM

## 2024-05-03 RX ORDER — OXYCODONE HYDROCHLORIDE 5 MG/1
5 TABLET ORAL EVERY 4 HOURS PRN
Status: DISCONTINUED | OUTPATIENT
Start: 2024-05-03 | End: 2024-05-03 | Stop reason: HOSPADM

## 2024-05-03 RX ORDER — FENTANYL CITRATE 50 UG/ML
INJECTION, SOLUTION INTRAMUSCULAR; INTRAVENOUS AS NEEDED
Status: DISCONTINUED | OUTPATIENT
Start: 2024-05-03 | End: 2024-05-03

## 2024-05-03 RX ORDER — LIDOCAINE HYDROCHLORIDE 20 MG/ML
INJECTION, SOLUTION EPIDURAL; INFILTRATION; INTRACAUDAL; PERINEURAL AS NEEDED
Status: DISCONTINUED | OUTPATIENT
Start: 2024-05-03 | End: 2024-05-03

## 2024-05-03 RX ORDER — OXYCODONE HYDROCHLORIDE 5 MG/1
5 TABLET ORAL 2 TIMES DAILY PRN
Qty: 8 TABLET | Refills: 0 | Status: SHIPPED | OUTPATIENT
Start: 2024-05-03 | End: 2024-05-07

## 2024-05-03 RX ORDER — HYDRALAZINE HYDROCHLORIDE 20 MG/ML
5 INJECTION INTRAMUSCULAR; INTRAVENOUS EVERY 30 MIN PRN
Status: DISCONTINUED | OUTPATIENT
Start: 2024-05-03 | End: 2024-05-03 | Stop reason: HOSPADM

## 2024-05-03 RX ORDER — ONDANSETRON HYDROCHLORIDE 2 MG/ML
INJECTION, SOLUTION INTRAVENOUS AS NEEDED
Status: DISCONTINUED | OUTPATIENT
Start: 2024-05-03 | End: 2024-05-03

## 2024-05-03 RX ORDER — LABETALOL HYDROCHLORIDE 5 MG/ML
5 INJECTION, SOLUTION INTRAVENOUS ONCE AS NEEDED
Status: DISCONTINUED | OUTPATIENT
Start: 2024-05-03 | End: 2024-05-03 | Stop reason: HOSPADM

## 2024-05-03 RX ORDER — LIDOCAINE HYDROCHLORIDE 10 MG/ML
0.1 INJECTION, SOLUTION EPIDURAL; INFILTRATION; INTRACAUDAL; PERINEURAL ONCE
Status: DISCONTINUED | OUTPATIENT
Start: 2024-05-03 | End: 2024-05-03 | Stop reason: HOSPADM

## 2024-05-03 RX ORDER — SODIUM CHLORIDE 9 MG/ML
INJECTION, SOLUTION INTRAVENOUS CONTINUOUS PRN
Status: DISCONTINUED | OUTPATIENT
Start: 2024-05-03 | End: 2024-05-03

## 2024-05-03 RX ORDER — ONDANSETRON HYDROCHLORIDE 2 MG/ML
4 INJECTION, SOLUTION INTRAVENOUS ONCE AS NEEDED
Status: DISCONTINUED | OUTPATIENT
Start: 2024-05-03 | End: 2024-05-03 | Stop reason: HOSPADM

## 2024-05-03 RX ORDER — SODIUM CHLORIDE, SODIUM LACTATE, POTASSIUM CHLORIDE, CALCIUM CHLORIDE 600; 310; 30; 20 MG/100ML; MG/100ML; MG/100ML; MG/100ML
100 INJECTION, SOLUTION INTRAVENOUS CONTINUOUS
Status: DISCONTINUED | OUTPATIENT
Start: 2024-05-03 | End: 2024-05-03 | Stop reason: HOSPADM

## 2024-05-03 RX ORDER — PROPOFOL 10 MG/ML
INJECTION, EMULSION INTRAVENOUS CONTINUOUS PRN
Status: DISCONTINUED | OUTPATIENT
Start: 2024-05-03 | End: 2024-05-03

## 2024-05-03 RX ORDER — PROPOFOL 10 MG/ML
INJECTION, EMULSION INTRAVENOUS AS NEEDED
Status: DISCONTINUED | OUTPATIENT
Start: 2024-05-03 | End: 2024-05-03

## 2024-05-03 RX ORDER — ALBUTEROL SULFATE 0.83 MG/ML
2.5 SOLUTION RESPIRATORY (INHALATION) ONCE AS NEEDED
Status: DISCONTINUED | OUTPATIENT
Start: 2024-05-03 | End: 2024-05-03 | Stop reason: HOSPADM

## 2024-05-03 RX ADMIN — PROPOFOL 50 MCG/KG/MIN: 10 INJECTION, EMULSION INTRAVENOUS at 08:50

## 2024-05-03 RX ADMIN — LIDOCAINE HYDROCHLORIDE 50 MG: 20 INJECTION, SOLUTION EPIDURAL; INFILTRATION; INTRACAUDAL; PERINEURAL at 08:50

## 2024-05-03 RX ADMIN — PROPOFOL 20 MG: 10 INJECTION, EMULSION INTRAVENOUS at 08:52

## 2024-05-03 RX ADMIN — ONDANSETRON 4 MG: 2 INJECTION INTRAMUSCULAR; INTRAVENOUS at 09:15

## 2024-05-03 RX ADMIN — SODIUM CHLORIDE: 9 INJECTION, SOLUTION INTRAVENOUS at 08:44

## 2024-05-03 RX ADMIN — FENTANYL CITRATE 25 MCG: 50 INJECTION, SOLUTION INTRAMUSCULAR; INTRAVENOUS at 08:52

## 2024-05-03 RX ADMIN — FENTANYL CITRATE 25 MCG: 50 INJECTION, SOLUTION INTRAMUSCULAR; INTRAVENOUS at 08:47

## 2024-05-03 ASSESSMENT — COLUMBIA-SUICIDE SEVERITY RATING SCALE - C-SSRS
2. HAVE YOU ACTUALLY HAD ANY THOUGHTS OF KILLING YOURSELF?: NO
6. HAVE YOU EVER DONE ANYTHING, STARTED TO DO ANYTHING, OR PREPARED TO DO ANYTHING TO END YOUR LIFE?: NO
1. IN THE PAST MONTH, HAVE YOU WISHED YOU WERE DEAD OR WISHED YOU COULD GO TO SLEEP AND NOT WAKE UP?: NO

## 2024-05-03 ASSESSMENT — PAIN - FUNCTIONAL ASSESSMENT
PAIN_FUNCTIONAL_ASSESSMENT: 0-10

## 2024-05-03 ASSESSMENT — PAIN SCALES - GENERAL
PAINLEVEL_OUTOF10: 3
PAINLEVEL_OUTOF10: 0 - NO PAIN

## 2024-05-03 NOTE — DISCHARGE INSTRUCTIONS
Post-surgery instructions:         Blood thinners: If taking any, you may resume them tomorrow evening.         Pain Medications: These have been sent to your pharmacy. You will be getting 5-10 pills for post-surgery pain. You will not be getting a refill, so don't lose them.         Antibiotics: Please continue take your IV antibiotics as prescribed during dialysis.         Activity: Avoid strenuous activity until the follow up appointment. No lifting over 10 lbs for 24hrs. and no lifting over 30 pounds for 6 weeks.  After that return to your normal activity level.         Bandages:  Leave the bandages in place until the follow up appointment or at least for 72 hours. Keep them dry.         Showering/Bathing:         Do not shower until the follow up appointment for a minimum of 72 hours after surgery, sponge bathe only.       Do not completely submerge incision (no bath tub, hot tub, pool, lake, etc)  until skin has fully healed over (approximately one month).       Scheduling for pain management: [phone]       After hours, call the   ([phone]) and ask for the pain management answering service if you notice any of the below:         Call the doctor immediately: if you notice:         Excessive bleeding from procedure site (brisk bright red bleeding from the site or bleeding that soaks the bandages or does not stop)  Severe headache  Inability to walk, leg or arm weakness or numbness that is significantly worse after the procedure  Uncontrolled pain  Inability to control your bowels or bladder  Signs of infection: Fever above 101.5F, redness, swelling, pus or drainage from the site

## 2024-05-03 NOTE — ANESTHESIA POSTPROCEDURE EVALUATION
Patient: Amrik Gould    Procedure Summary       Date: 05/03/24 Room / Location: [facility] A OR 07 / Virtual [facility] A OR    Anesthesia Start: 0844 Anesthesia Stop: 0949    Procedure: Lower Extremity Peripheral Nerve Stimulator Removal (Right) Diagnosis:       Causalgia of right lower extremity      (Causalgia of right lower extremity [G57.71])    Surgeons: Kamaljit Mackey MD PhD Responsible Provider: James Kapadia MD    Anesthesia Type: MAC ASA Status: 4            Anesthesia Type: MAC    Vitals Value Taken Time   /57 05/03/24 1016   Temp 36.5 °C (97.7 °F) 05/03/24 0945   Pulse 75 05/03/24 1018   Resp 15 05/03/24 1015   SpO2 96 % 05/03/24 1016   Vitals shown include unfiled device data.    Anesthesia Post Evaluation    Patient participation: complete - patient participated  Level of consciousness: awake  Pain management: adequate  Airway patency: patent  Cardiovascular status: acceptable and hemodynamically stable  Respiratory status: acceptable  Hydration status: acceptable  Postoperative Nausea and Vomiting: none        There were no known notable events for this encounter.

## 2024-05-03 NOTE — OP NOTE
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] Post-op Diagnosis     * Causalgia of right lower extremity [G57.71]     Procedures  Lower Extremity Peripheral Nerve Stimulator Removal  65396 - DC REVJ/RMVL PERPH NEUROSTIMULATOR ELECTRODE ARRAY    Lower Extremity Peripheral Nerve Stimulator Removal  43264 - DC REV/RMV PRPH SAC/GSTRC NPG/RCV [facility] RA      Surgeons      * Kamaljit Mackey - Primary    Resident/Fellow/Other Assistant:  Surgeons and Role:  * No surgeons found with a matching role *    Procedure Summary  Anesthesia: Monitor Anesthesia Care  ASA: IV  Anesthesia Staff: Anesthesiologist: James Kapadia MD  C-AA: ABNER [place]  Estimated Blood Loss: 5 mL  Intra-op Medications:   Administrations occurring from 0900 to 1030 on 24:   Medication Name Total Dose   BUPivacaine HCl (Marcaine) 0.5 % (5 mg/mL) 10 mL, lidocaine-epinephrine (Xylocaine W/EPI) 1 %-1:100,000 10 mL syringe 5 mL   vancomycin (Vancocin) vial for injection 1 g              Anesthesia Record               Intraprocedure I/O Totals          Intake    Propofol Drip 0.00 mL    The total shown is the total volume documented since Anesthesia Start was filed.    sodium chloride 0.9% 100.00 mL    Total Intake 100 mL          Specimen:   ID Type Source Tests Collected by Time   A : Superficial wound excision #1 Tissue SOFT TISSUE RESECTION FUNGAL CULTURE/SMEAR, TISSUE/WOUND CULTURE/SMEAR Kamaljit Mackey MD PhD 5/3/2024 09   B : Superficial wound excision #2 Tissue SOFT TISSUE RESECTION FUNGAL CULTURE/SMEAR, TISSUE/WOUND CULTURE/SMEAR Kamaljit Mackey MD PhD 5/3/2024 09   C : Superficial Wound Excision Swab #1 Swab SKIN EXCISION FUNGAL CULTURE/SMEAR, TISSUE/WOUND CULTURE/SMEAR Kamaljit Mackey MD PhD  5/3/2024 0920   D : Explanted leads Tissue HARDWARE FUNGAL CULTURE/SMEAR, TISSUE/WOUND CULTURE/SMEAR Kamaljit Mackey MD PhD 5/3/2024 0912        Staff:   Circulator: Hector Sepulveda RN; Heather Dominguez RN  Scrub Person: Aishwarya Lindsayr         Drains and/or Catheters: * None in log *      Findings: See operative note    Indications: Amrik Gould is an 78 y.o. 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  The patient has had an implanted Curonix peripheral nerve stimulator around the right sciatic nerve with 2 cylindrical leads in the late fall.  The patient has had excellent relief for his right lower extremity pain.  Unfortunately, after a fall in [place], the patient developed slight ulceration on the back of the thigh and he has had oozing of yellowish material subsequently from 2 little areas.  He had seen a pain specialist in [place] and has been receiving vancomycin and another antibiotic through the dialysis catheter, 3 times a week.  He presents today for explant of the peripheral nerve stimulator leads.    The patient was seen in the preoperative area. The risks, benefits, complications, treatment options, non-operative alternatives, expected recovery and outcomes were discussed with the patient. The possibilities of reaction to medication, pulmonary aspiration, injury to surrounding structures, bleeding, recurrent infection, the need for additional procedures, failure to diagnose a condition, and creating a complication requiring transfusion or operation were discussed with the patient. The patient concurred with the proposed plan, giving informed consent.  The site of surgery was properly noted/marked if necessary per policy. The patient has been actively warmed in preoperative area. Preoperative antibiotics are not indicated. Venous thrombosis prophylaxis are not indicated.    Procedure Details:   Following informed consent, the patient was brought to the operating room and  placed in the left lateral decubitus position and secured in place with the help of a beanbag.  The posterolateral aspect of the right knee area was prepped and draped in the usual sterile fashion.  The skin and subcutaneous tissue around both areas of crusting wound tissue, were anesthetized with the aforementioned local anesthetic solution.  A total volume of 6 cc was used.  Local anesthetic was injected in an elliptical fashion around each wound.  The skin was then incised with a #15 blade from both areas and sent for culture.  The deeper tissue appeared to be healthy without evidence of necrosis or purulence.  Fluoroscopic guidance to establish the location of the 2 leads in the proximal incision.  The leads were pulled out uneventfully with a DeBakey clamp.  No foreign bodies were left as confirmed by x-ray.  Pulsavac was used to irrigate the patient was thoroughly with vancomycin containing solution.  The wound was then closed in layered fashion with 0 Vicryl for the deeper layer and 0 Ethilon for the skin in a mattress fashion.  Topical antibiotic ointment and island dressings were then applied and the patient was transferred to the recovery room in stable condition.  Complications:  None; patient tolerated the procedure well.    Disposition: PACU - hemodynamically stable.  Condition: stable         Additional Details: The patient will follow-up with us in the clinic in 10 days.  He was provided with a short course of oxycodone 5 mg twice daily as needed for 4 days.    Attending Attestation: I was present for the entire procedure.    Kamaljit Mackey  Phone Number: [phone]

## 2024-05-05 LAB
BACTERIA SPEC CULT: ABNORMAL
BACTERIA SPEC CULT: NORMAL
BACTERIA SPEC CULT: NORMAL
GRAM STN SPEC: ABNORMAL
GRAM STN SPEC: NORMAL

## 2024-05-08 ENCOUNTER — APPOINTMENT (OUTPATIENT)
Dept: PAIN MEDICINE | Facility: HOSPITAL | Age: 79
End: 2024-05-08
Payer: MEDICARE

## 2024-05-13 ENCOUNTER — OFFICE VISIT (OUTPATIENT)
Dept: PAIN MEDICINE | Facility: HOSPITAL | Age: 79
End: 2024-05-13
Payer: MEDICARE

## 2024-05-13 DIAGNOSIS — G57.71 CAUSALGIA OF RIGHT LOWER EXTREMITY: Primary | ICD-10-CM

## 2024-05-13 PROCEDURE — 99024 POSTOP FOLLOW-UP VISIT: CPT | Performed by: ANESTHESIOLOGY

## 2024-05-13 PROCEDURE — 1159F MED LIST DOCD IN RCRD: CPT | Performed by: ANESTHESIOLOGY

## 2024-05-13 NOTE — PROGRESS NOTES
History Of Present Illness  Amrik Gould is a 78 y.o. male presenting as a follow-up visit in clinic today status post peripheral nerve stimulator explant.  Unfortunately, patient's pain was completely relieved while the implant was then.  Now, patient's pain has gotten much worse.  Reports no discharge or swelling/erythema from incision sites.     Review of Systems   13 point ROS done and negative except for the above.     Physical Exam  PHYSICAL EXAM  Vitals signs reviewed  Constitutional:    General: In wheelchair, not in acute distress   Appearance: Normal appearance. Not ill-appearing.  HENT:   Head: Normocephalic and atraumatic  Eyes:   Conjunctiva/sclera normal  Cardiovascular:  No jugular venous distention bilaterally  No gross edema in lower extremities  Pulmonary:   Effort: No respiratory distress  Abdominal:  Abdomen appears nondistended  Musculoskeletal:   Moves all extremities equally  Skin:  Incision site is clean, dry, intact; nonerythematous, nonedematous; no pustular discharge.  Neurological:   General: No focal deficit present  Psychiatric:    Mood and Affect: Mood normal    Behavior: Behavior normal    Last Recorded Vitals  There were no vitals taken for this visit.    Relevant Results      ASSESSMENT:  Assessment/Plan   Problem List Items Addressed This Visit    None      Patient is a 78-year-old male presents to follow-up visit in pain clinic today status post peripheral nerve stimulator explant of right thigh.  Unfortunately, despite patient getting percent relief of his pain, he developed a soft tissue infection.  Currently getting vancomycin and cefepime, HD Tuesday, Thursday, Saturday.  Discussed at length alternatives, such as ketamine fusions or proceeding with Sprint peripheral nerve percutaneous stimulator.  Given the worst pain is the dorsum of the foot and the toes, will target more distal right leg just proximal to the common peroneal branch.  Could also potentially reimplant with  just 1-lead at this more distal site to reduce infection risk, however counseled him that given his diabetes, hemodialysis, and lack of mobility he will remain at elevated risk for infection.    PLAN:  -  Abhinav referral for ketamine infusions  - Schedule for Sprint percutaneous peripheral nerve stimulator this Friday with Dr. Enriquez.  Risks, benefits and alternatives of the procedure were discussed with the patient who expressed understanding and agrees to proceed.      The plan was discussed with the patient and they were invited to contact us back anytime with any questions or concerns and follow-up with us in the office as needed.    Bridger Vo MD

## 2024-05-14 ENCOUNTER — OFFICE VISIT (OUTPATIENT)
Dept: OPHTHALMOLOGY | Facility: CLINIC | Age: 79
End: 2024-05-14
Payer: MEDICARE

## 2024-05-14 DIAGNOSIS — E08.37X3 DIABETES MELLITUS DUE TO UNDERLYING CONDITION WITH DIABETIC MACULAR EDEMA OF BOTH EYES RESOLVED AFTER TREATMENT, WITH LONG-TERM CURRENT USE OF INSULIN (MULTI): ICD-10-CM

## 2024-05-14 DIAGNOSIS — Z79.4 DIABETES MELLITUS DUE TO UNDERLYING CONDITION WITH DIABETIC MACULAR EDEMA OF BOTH EYES RESOLVED AFTER TREATMENT, WITH LONG-TERM CURRENT USE OF INSULIN (MULTI): ICD-10-CM

## 2024-05-14 DIAGNOSIS — H35.373 EPIRETINAL MEMBRANE (ERM) OF BOTH EYES: ICD-10-CM

## 2024-05-14 DIAGNOSIS — E11.9 CONTROLLED TYPE 2 DIABETES MELLITUS WITHOUT COMPLICATION, UNSPECIFIED WHETHER LONG TERM INSULIN USE (MULTI): Primary | ICD-10-CM

## 2024-05-14 PROCEDURE — 99213 OFFICE O/P EST LOW 20 MIN: CPT

## 2024-05-14 ASSESSMENT — TONOMETRY
OD_IOP_MMHG: 16
OS_IOP_MMHG: 14
IOP_METHOD: GOLDMANN APPLANATION

## 2024-05-14 ASSESSMENT — ENCOUNTER SYMPTOMS
ALLERGIC/IMMUNOLOGIC NEGATIVE: 0
GASTROINTESTINAL NEGATIVE: 0
HEMATOLOGIC/LYMPHATIC NEGATIVE: 0
RESPIRATORY NEGATIVE: 0
PSYCHIATRIC NEGATIVE: 0
EYES NEGATIVE: 0
NEUROLOGICAL NEGATIVE: 0
MUSCULOSKELETAL NEGATIVE: 0
CARDIOVASCULAR NEGATIVE: 0
CONSTITUTIONAL NEGATIVE: 0
ENDOCRINE NEGATIVE: 0

## 2024-05-14 ASSESSMENT — PACHYMETRY
OS_CT(UM): 644
OD_CT(UM): 622

## 2024-05-14 ASSESSMENT — SLIT LAMP EXAM - LIDS: COMMENTS: GOOD POSITION

## 2024-05-14 ASSESSMENT — VISUAL ACUITY
OS_CC+: -216
METHOD: SNELLEN - LINEAR
OS_CC: 20/30
OD_CC: 20/25
OD_PH_SC: 14

## 2024-05-14 ASSESSMENT — CONF VISUAL FIELD
OD_SUPERIOR_TEMPORAL_RESTRICTION: 0
OS_INFERIOR_TEMPORAL_RESTRICTION: 0
OS_SUPERIOR_TEMPORAL_RESTRICTION: 0
OS_NORMAL: 1
OD_INFERIOR_TEMPORAL_RESTRICTION: 0
OS_INFERIOR_NASAL_RESTRICTION: 0
OD_SUPERIOR_NASAL_RESTRICTION: 0
OD_INFERIOR_NASAL_RESTRICTION: 0
OS_SUPERIOR_NASAL_RESTRICTION: 0
OD_NORMAL: 1

## 2024-05-14 ASSESSMENT — CUP TO DISC RATIO
OS_RATIO: 0.6
OD_RATIO: 0.6

## 2024-05-14 ASSESSMENT — EXTERNAL EXAM - RIGHT EYE: OD_EXAM: NORMAL

## 2024-05-14 ASSESSMENT — EXTERNAL EXAM - LEFT EYE: OS_EXAM: NORMAL

## 2024-05-17 ENCOUNTER — HOSPITAL ENCOUNTER (OUTPATIENT)
Dept: OPERATING ROOM | Facility: CLINIC | Age: 79
Setting detail: OUTPATIENT SURGERY
Discharge: HOME | End: 2024-05-17
Payer: MEDICARE

## 2024-05-17 VITALS
RESPIRATION RATE: 16 BRPM | WEIGHT: 195 LBS | HEART RATE: 70 BPM | SYSTOLIC BLOOD PRESSURE: 118 MMHG | DIASTOLIC BLOOD PRESSURE: 60 MMHG | HEIGHT: 69 IN | BODY MASS INDEX: 28.88 KG/M2 | TEMPERATURE: 97.7 F | OXYGEN SATURATION: 96 %

## 2024-05-17 DIAGNOSIS — E11.42 DIABETIC PERIPHERAL NEUROPATHY (MULTI): ICD-10-CM

## 2024-05-17 DIAGNOSIS — G57.71 CAUSALGIA OF RIGHT LOWER EXTREMITY: ICD-10-CM

## 2024-05-17 DIAGNOSIS — Z89.431 STATUS POST AMPUTATION OF RIGHT FOOT THROUGH METATARSAL BONE (MULTI): Primary | ICD-10-CM

## 2024-05-17 LAB — GLUCOSE BLD MANUAL STRIP-MCNC: 96 MG/DL (ref 74–99)

## 2024-05-17 PROCEDURE — 99152 MOD SED SAME PHYS/QHP 5/>YRS: CPT

## 2024-05-17 PROCEDURE — 64555 IMPLANT NEUROELECTRODES: CPT | Performed by: PHYSICAL MEDICINE & REHABILITATION

## 2024-05-17 PROCEDURE — 82947 ASSAY GLUCOSE BLOOD QUANT: CPT

## 2024-05-17 PROCEDURE — 2500000004 HC RX 250 GENERAL PHARMACY W/ HCPCS (ALT 636 FOR OP/ED): Performed by: PHYSICAL MEDICINE & REHABILITATION

## 2024-05-17 PROCEDURE — 64455 NJX AA&/STRD PLTR COM DG NRV: CPT

## 2024-05-17 PROCEDURE — 2500000005 HC RX 250 GENERAL PHARMACY W/O HCPCS: Performed by: PHYSICAL MEDICINE & REHABILITATION

## 2024-05-17 RX ORDER — MIDAZOLAM HYDROCHLORIDE 1 MG/ML
INJECTION INTRAMUSCULAR; INTRAVENOUS AS NEEDED
Status: COMPLETED | OUTPATIENT
Start: 2024-05-17 | End: 2024-05-17

## 2024-05-17 RX ORDER — LIDOCAINE HYDROCHLORIDE 5 MG/ML
INJECTION, SOLUTION INFILTRATION; PERINEURAL AS NEEDED
Status: COMPLETED | OUTPATIENT
Start: 2024-05-17 | End: 2024-05-17

## 2024-05-17 RX ORDER — VANCOMYCIN/0.9 % SOD CHLORIDE 1 G/100 ML
PLASTIC BAG, INJECTION (ML) INTRAVENOUS
COMMUNITY

## 2024-05-17 RX ADMIN — MIDAZOLAM HYDROCHLORIDE 1 MG: 1 INJECTION, SOLUTION INTRAMUSCULAR; INTRAVENOUS at 08:26

## 2024-05-17 RX ADMIN — LIDOCAINE HYDROCHLORIDE 3 ML: 5 INJECTION, SOLUTION INFILTRATION; PERINEURAL at 08:27

## 2024-05-17 ASSESSMENT — PAIN - FUNCTIONAL ASSESSMENT
PAIN_FUNCTIONAL_ASSESSMENT: 0-10

## 2024-05-17 ASSESSMENT — PAIN SCALES - GENERAL
PAINLEVEL_OUTOF10: 0 - NO PAIN

## 2024-05-17 NOTE — PROCEDURES
General    Date/Time: 5/17/2024 8:45 AM    Performed by: Javier Enriquez MD  Authorized by: Javier Enriquez MD    Consent:     Consent obtained:  Written    Consent given by:  Patient    Risks, benefits, and alternatives were discussed: yes      Risks discussed:  Bleeding, infection, pain and nerve damage    Alternatives discussed:  No treatment, delayed treatment and alternative treatment  Universal protocol:     Procedure explained and questions answered to patient or proxy's satisfaction: yes      Relevant documents present and verified: yes      Test results available: yes      Imaging studies available: yes      Required blood products, implants, devices, and special equipment available: yes      Site/side marked: yes      Immediately prior to procedure, a time out was called: yes      Patient identity confirmed:  Verbally with patient, hospital-assigned identification number and arm band  Procedure specific details:      Peripheral nerve stimulator      The patient was first placed in the prone position, padded to foster comfort and was prepped and draped in the usual sterile manner.  Sterile precautions, including sterile gloves, disposable cap and masks were worn for the procedure at all times. There was no evidence of infection at the site(s) of needle insertion.    A final time-out was performed.    Prior to delivery of anesthetics appropriate skin and bony landmarks were identified, and pertinent vascular structures were located.  The skin overlying the below nerve(s) was prepped and draped in sterile fashion. Ultrasound was used to identify the as associated surrounding structures in proximity to the below nerve(s).  The skin around the planned entry point and the subcutaneous tissues were injected with 0.5% Lidocaine    A percutaneous sleeve and stimulating probe lead introduction system were assembled, inserted and advanced along the intended course to the below nerve(s), taking care to maintain the  proper depth of insertion as the introducer is advanced under ultrasound guidance.  The introducer needle was delivered to a location in proximity to the nerve.    Multiple stimulation parameters were used to deliver stimulation to the target nerve(s) below in concert with stimulating at multiple positions around the nerve. Nerve target acquisition was confirmed noting generation of paresthesias in the target nerve(s) distribution. Various electrical parameter combinations were tested, and the lead location was adjusted (physically relocated) until the patient indicated paresthesia overlapping the distribution of the patient´s typical region of pain.     The stimulating probe was removed from the introducer and a percutaneous lead was guided through the needle and delivered to a location in similar proximity to the nerve. Final location was verified with electrical stimulation and documented with ultrasound.      The introducer needle was removed, and the exposed end of the percutaneous lead was attached to an external stimulator unit. Various electrical parameter combinations were again tested until the patient indicated paresthesia overlapping the distribution of the patient´s typical region of pain.     After confirming that lead impedance was in the normal range, the external stimulator unit was detached, the needle was removed, and the lead was anchored at the skin.    The lead was threaded into the connector block and electrical continuity and desired patient response was confirmed.  The connector block was attached to the external stimulator unit.    The site was covered with a sterile occlusive dressing and an ultrasound image was taken to document final placement.     The procedure was repeated for any subsequent nerve(s) below.    The patient was observed for stability of vital signs and comfort.       Laterality: Right  Nerve: [Sciatic -popliteal approach]   Device Company: [employer]      1 mg midazolam  given for procedural sedation.

## 2024-05-17 NOTE — H&P
Date of Hospital Visit: 19  Encounter Provider: Jarad Toledo MD  Place of Service: [facility] CARDIOLOGY  Patient Name: Rosalio Rene  :1938  [id number]  Referral Provider: Alo Ceballos MD    Chief complaint: Pulmonary Emboli    History of Present Illness:  Mr. Rene is a 81 year old male who recently underwent surgery for laryngeal cancer requiring intubation post operatively  due to respiratory failure and peg placement for nutritional support. He was discharged on 19 home and was followed by home health with PT and OT.     He returned to the emergency room last night with increasing shortness of breath for the 3 days after discharge. He reports bilateral lower edema and a cough. He is still on cipro for lower extremity cellulitis. His Ct of chest revealed acute pulmonary thromboemboli and pulmonary arterial branches to the right upper lobe. He was given Lovenox I the emergency room and we were consulted for further treatment. Labs: trop<0.010, proBNP 1,665.0, bun/creat 31/0.76, K+4.6, hgb/hct 12.2/40.3, mg+ 1.8.     This is a gentleman recently chronically ill and was hospitalized, who now comes in with more shortness of breath and is found to have pulmonary emboli.  The amount of embolus does not appear to be really large, but we are asked to see him because his right side is a little bit enlarged.  But if you look back, he has had right-sided enlargement and probably pulmonary hypertension for years because of his COPD.            Current Testing:  Venous Duplex of Lower extremities: Pending      CT of chest     1. Study is positive for acute pulmonary thromboembolus and pulmonary  arterial branches to the right upper lobe and right lower lobe. While  this is nonocclusive thrombus, the patient does have an abnormal RV LV  ratio, which may reflect right heart strain.  2. Areas of consolidation noted at the lung bases bilaterally. Similar  findings were  Pain Management H&P    History Of Present Illness  Amrik Gould is a 78 y.o. male presents for procedure state below. Endorses no changes in past medical history or medical health since last seen in clinic.      Past Medical History  He has a past medical history of Amblyopia, Arthritis, Cataract, Delayed emergence from general anesthesia, Diabetes mellitus (Multi), Hemodialysis status (CMS-[facility]), Obstructive sleep apnea (adult) (pediatric) (07/09/2014), Other conditions influencing health status, Other conditions influencing health status, Personal history of other diseases of the circulatory system, Personal history of other diseases of the digestive system, Personal history of other diseases of the musculoskeletal system and connective tissue, Personal history of other diseases of the nervous system and sense organs, Personal history of other diseases of urinary system, Personal history of other endocrine, nutritional and metabolic disease, Personal history of other endocrine, nutritional and metabolic disease, and Stroke (Multi).    Surgical History  He has a past surgical history that includes Other surgical history (09/29/2014); Other surgical history (07/31/2019); Lumbar fusion (07/09/2014); Spine surgery (07/09/2014); Coronary artery bypass graft; Coronary stent placement; Cataract extraction (Bilateral); Vascular surgery; Cholecystectomy; Knee arthroscopy w/ debridement (Bilateral); and AV fistula placement (Left).     Social History  He reports that he has never smoked. He has never used smokeless tobacco. He reports that he does not drink alcohol and does not use drugs.    Family History  Family History   Problem Relation Name Age of Onset    Cancer Father Amrik     Diabetes Father Amrik         Allergies  Patient has no known allergies.    Review of Symptoms:   Constitutional: Negative for chills, diaphoresis or fever  HENT: Negative for neck swelling  Eyes:.  Negative for eye pain  Respiratory:.   Negative for cough, shortness of breath or wheezing    Cardiovascular:.  Negative for chest pain or palpitations  Gastrointestinal:.  Negative for abdominal pain, nausea and vomiting  Genitourinary:.  Negative for urgency  Musculoskeletal:  Positive for right leg pain. Positive for joint pain. Denies falls within the past 3 months.  Skin: Negative for wounds or itching   Neurological: Negative for dizziness, seizures, loss of consciousness and weakness  Endo/Heme/Allergies: Does not bruise/bleed easily  Psychiatric/Behavioral: Negative for depression. The patient does not appear anxious.       PHYSICAL EXAM  Vitals signs reviewed  Constitutional:       General: Not in acute distress     Appearance: Normal appearance. Not ill-appearing.  HENT:     Head: Normocephalic and atraumatic  Eyes:     Conjunctiva/sclera: Conjunctivae normal  Cardiovascular:     Rate and Rhythm: Normal rate and regular rhythm  Pulmonary:     Effort: No respiratory distress  Abdominal:     Palpations: Abdomen is soft  Musculoskeletal: GUAJARDO  Skin:     General: Skin is warm and dry  Neurological:     General: No focal deficit present  Psychiatric:         Mood and Affect: Mood normal         Behavior: Behavior normal     Last Recorded Vitals  There were no vitals taken for this visit.    Relevant Results  Current Outpatient Medications   Medication Instructions    Accu-Chek Fastclix Lancet Drum misc 3 times daily,  USE TO TEST AS DIRECTED    Accu-Chek Guide test strips strip 3 times daily,  USE TO TEST BLOOD SUGARS AS DIRECTED    allopurinol (ZYLOPRIM) 100 mg, oral, Daily    ascorbic acid (VITAMIN C) 1,000 mg, oral, Daily    aspirin 81 mg, oral, Daily    atorvastatin (LIPITOR) 40 mg, oral    atorvastatin (LIPITOR) 20 mg, oral, Daily    carvedilol (COREG) 3.125 mg, oral, 2 times daily    carvedilol (COREG) 3.125 mg, oral, 2 times daily (morning and late afternoon)    cholecalciferol (Vitamin D-3) 10 MCG (400 UNIT) tablet oral    cinacalcet  present on prior exam from July 2018. These may reflect  some fibrotic changes, but the possibility of superimposed infiltrate is  not excluded. Patient is also noted to have some enlarged hilar nodes.  These may be reactive, but attention to them on short-term follow-up  suggested. Furthermore, the patient has a left lower lobe pulmonary  nodule, which is increased in size compared to prior exam from July 2018. Metastatic disease cannot be excluded, and correlation with PET is  recommended on a nonemergent outpatient basis      Previous Cardiac Testing:    Echo 10/2018    · Left ventricular systolic function is hyperdynamic (EF > 70%). Calculated EF = 71%. Estimated EF was in agreement with the calculated EF. Normal left ventricular cavity size noted. All left ventricular wall segments contract normally. Left ventricular wall thickness is consistent with mild concentric hypertrophy. Left ventricular diastolic dysfunction is noted (grade I) consistent with impaired relaxation.  · Normal right ventricular wall thickness and systolic function noted. Right ventricular cavity is moderately dilated. Abnormal septal motion. Systolic flattening of interventricular septum consistent with right ventricle pressure overload.  · Right atrial cavity size is moderately dilated  · The aortic valve is abnormal in structure. There is thickening of the aortic valve.  · Physiologic tricuspid valve regurgitation is present. Calculated right ventricular systolic pressure from tricuspid regurgitation is 21 mmHg. Insufficient TR velocity profile to estimate the right ventricular systolic pressure. This is an incomplete jet of tricuspid regurgitation and cannot assess RV systolic pressure adequately/accurately based on the signal. From the 2-D images and findings of systolic flattening of the ventricular septum, there is likely significant pulmonary hypertension.  · Patient was contacted to return for additional imaging but absolutely  (SENSIPAR) 30 mg, oral, Daily, Take with food or shortly afer a meal. Swallow tablet whole; do not break or divide.    clotrimazole-betamethasone (Lotrisone) cream 1 Application, Topical, 2 times daily, to affected area    co-enzyme Q-10 30 mg, oral, Daily    ergocalciferol (VITAMIN D-2) 50,000 Units, oral, Once Weekly    esomeprazole (NEXIUM) 20 mg, oral, Daily before breakfast, Do not open capsule.    gabapentin (NEURONTIN) 200 mg, oral, 2 times daily    krill oil 500 mg capsule oral    midodrine (PROAMATINE) 10 mg, oral, 3 times weekly,  Take one tablet on the way to dialysis. Do not take carvedilol before dialysis.<BR>    mupirocin (Bactroban) 2 % ointment Use three days before and 2 days after surgery    naloxone (Narcan) 4 mg/0.1 mL nasal spray 0.1 mL, nasal, As needed, FOR ACCIDENTAL OVERDOSE    pantoprazole (PROTONIX) 40 mg, oral, Daily    Lesley-[facility] Rx 1- mg-mg-mcg tablet 1 tablet, oral, Daily    sertraline (Zoloft) 50 mg tablet oral    sevelamer carbonate (Renvela) 800 mg tablet 3 tablets, oral, 3 times daily (morning, midday, late afternoon)    sorbitol 70 % solution 30 mL, oral, Every 4 hours PRN, until loos BM then prn<BR>    topiramate (TOPAMAX) 50 mg, oral,  in addition to regular prescription after each dialysis session<BR>    traMADol (ULTRAM) 50 mg, oral, As needed, after each dialysis session    zinc gluconate 50 mg tablet 50 mg of elemental zinc, oral, Daily       No results found for this or any previous visit from the past 1000 days.     No image results found.       1. Causalgia of right lower extremity  Stimulator Lead Trial    Stimulator Lead Trial           ASSESSMENT/PLAN  Amrik Gould is a 78 y.o. male who presents for right sciatic nerve peripheral nerve stimulator trial ([facility])    Patient denies any recent antibiotic use or infections, denies any blood thinner use, and denies contrast or local anesthetic allergies     Risks, benefits, alternatives discussed. All questions  refused to do so      Past Medical History:   Diagnosis Date   • Abdominal cramping     AT TIMES   • Anxiety    • Cellulitis     BILATERAL LOWER EXTREMITIES   • Chronic cough    • COPD (chronic obstructive pulmonary disease) (CMS/HCC)    • Dysphasia    • Edema, lower extremity    • Enlarged heart     PER PATIENT?? STATES SAW CARDIOLOGIST YRS AGO BUT DOES NOT REMEMBER NAME    • History of laryngeal cancer     HISTORY OF LARYNGECTOMY, RADIATION   • Hyperlipidemia    • Hypertension    • Hypogonadism male    • Type 2 diabetes mellitus (CMS/HCC)    • Vocal cord paralysis        Past Surgical History:   Procedure Laterality Date   • CHOLECYSTECTOMY     • ENDOSCOPY N/A 2/2/2019    Procedure: ESOPHAGOGASTRODUODENOSCOPY;  Surgeon: Twin Bobby MD;  Location: [facility] OR;  Service: Gastroenterology   • LARYNGOSCOPY N/A 1/29/2019    Procedure: DIRECT SUSPENSION MICROLARYNGOSCOPY BIOPSY AND POSSIBLE CERVICE ESOPHAGOSCOPY;  Surgeon: Michael Bhagat MD;  Location: [facility] OR;  Service: ENT   • PEG TUBE INSERTION N/A 2/2/2019    Procedure: PERCUTANEOUS ENDOSCOPIC GASTROSTOMY TUBE INSERTION;  Surgeon: Twin Bobby MD;  Location: [facility] OR;  Service: Gastroenterology   • PROSTATECTOMY  2004   • TOTAL LARYNGECTOMY  2005       Medications Prior to Admission   Medication Sig Dispense Refill Last Dose   • albuterol sulfate  (90 Base) MCG/ACT inhaler Inhale 2 puffs Every 4 (Four) Hours As Needed for Wheezing.   1/29/2019 at 0300   • amoxicillin (AMOXIL) 500 MG capsule Take 1,000 mg by mouth 2 (Two) Times a Day. ALTERNATES WEEKS WITH CIPRO   1/28/2019 at Unknown time   • aspirin 81 MG tablet Take 81 mg by mouth Daily. HELD FOR OR   Past Week at Unknown time   • carvedilol (COREG) 3.125 MG tablet Take 3.125 mg by mouth 2 (Two) Times a Day With Meals.   1/29/2019 at 0300   • ciprofloxacin (CIPRO) 500 MG tablet Take 500 mg by mouth 2 (Two) Times a Day. ALTERNATES WEEKS WITH AMOXICILLIN    1/26/2019 at Unknown time   • clotrimazole-betamethasone (LOTRISONE) cream Apply  topically to the appropriate area as directed 2 (Two) Times a Day.      • diphenoxylate-atropine (LOMOTIL) 2.5-0.025 MG per tablet Take 1 tablet by mouth 4 (Four) Times a Day As Needed for Diarrhea.   Unknown at Unknown time   • econazole nitrate (SPECTAZOLE) 1 % cream Apply topically.      • fluticasone (FLONASE) 50 MCG/ACT nasal spray 2 sprays into the nostril(s) as directed by provider 2 (Two) Times a Day.   Unknown at Unknown time   • glimepiride (AMARYL) 4 MG tablet Take 4 mg by mouth Every Morning Before Breakfast.   1/28/2019 at Unknown time   • glimepiride (AMARYL) 4 MG tablet Take 8 mg by mouth Every Evening.   1/28/2019 at Unknown time   • ipratropium-albuterol (DUO-NEB) 0.5-2.5 mg/3 ml nebulizer Take 3 mL by nebulization Every 4 (Four) Hours As Needed for Wheezing.      • lidocaine-prilocaine (EMLA) cream Apply  topically to the appropriate area as directed As Needed.   1/29/2019 at Unknown time   • lisinopril (PRINIVIL,ZESTRIL) 20 MG tablet Take 20 mg by mouth Daily.   1/28/2019 at 0800   • metFORMIN (GLUCOPHAGE) 1000 MG tablet Take 1,000 mg by mouth 2 (Two) Times a Day.   1/28/2019 at Unknown time   • montelukast (SINGULAIR) 10 MG tablet Take 10 mg by mouth Every Night.      • umeclidinium-vilanterol (ANORO ELLIPTA) 62.5-25 MCG/INH aerosol powder  inhaler Inhale 1 puff Daily.   1/29/2019 at Unknown time   • JANUVIA 50 MG tablet Take 50 mg by mouth Daily. CURRENTLY NOT TAKING D/T COST  0 Unknown at Unknown time       Current Meds  Scheduled Meds:    carvedilol 3.125 mg Oral BID With Meals   enoxaparin 1 mg/kg Subcutaneous Q12H   insulin lispro 0-9 Units Subcutaneous 4x Daily With Meals & Nightly   lisinopril 20 mg Oral Daily   montelukast 10 mg Oral Nightly   sodium chloride 3 mL Intravenous Q12H     Continuous Infusions:   PRN Meds:.•  acetaminophen  •  dextrose  •  dextrose  •  glucagon (human recombinant)  •   answered to the best of my ability. Patient agrees to proceed.      Our plan is as follows:  - Proceed with aforementioned procedure          DO TRISTIN Oropeza Pain fellow      ipratropium-albuterol  •  sodium chloride    Allergies as of 2019   • (No Known Allergies)       Social History     Socioeconomic History   • Marital status: Single     Spouse name: Not on file   • Number of children: Not on file   • Years of education: Not on file   • Highest education level: Not on file   Social Needs   • Financial resource strain: Not on file   • Food insecurity - worry: Not on file   • Food insecurity - inability: Not on file   • Transportation needs - medical: Not on file   • Transportation needs - non-medical: Not on file   Occupational History   • Not on file   Tobacco Use   • Smoking status: Former Smoker     Packs/day: 1.50     Years: 50.00     Pack years: 75.00     Types: Cigarettes     Last attempt to quit: 3/8/2004     Years since quittin.9   • Smokeless tobacco: Never Used   Substance and Sexual Activity   • Alcohol use: Yes     Comment: SOCIAL   • Drug use: No   • Sexual activity: Defer   Other Topics Concern   • Not on file   Social History Narrative   • Not on file       Family History   Problem Relation Age of Onset   • Cancer Other    • Heart disease Other    • Malig Hyperthermia Neg Hx        REVIEW OF SYSTEMS:   ROS was performed and is negative except as outlined in HPI     REVIEW OF SYSTEMS:   CONSTITUTIONAL: No weight loss, fever, chills, weakness or fatigue.   HEENT: Eyes: No visual loss, blurred vision, double vision or yellow sclerae. Ears, Nose, Throat: No hearing loss, sneezing, congestion, runny nose or sore throat.   SKIN: No rash or itching.     RESPIRATORY: No shortness of breath, hemoptysis, cough or sputum.   GASTROINTESTINAL: No anorexia, nausea, vomiting or diarrhea. No abdominal pain, bright red blood per rectum or melena.  GENITOURINARY: No burning on urination, hematuria or increased frequency.  NEUROLOGICAL: No headache, dizziness, syncope, paralysis, ataxia, numbness or tingling in the extremities. No change in bowel or bladder control.  "  MUSCULOSKELETAL: No muscle, back pain, joint pain or stiffness.   HEMATOLOGIC: No anemia, bleeding or bruising.   LYMPHATICS: No enlarged nodes. No history of splenectomy.   PSYCHIATRIC: No history of depression, anxiety, hallucinations.   ENDOCRINOLOGIC: No reports of sweating, cold or heat intolerance. No polyuria or polydipsia.        Objective:   Temp:  [97.6 °F (36.4 °C)-98.5 °F (36.9 °C)] 98.5 °F (36.9 °C)  Heart Rate:  [65-90] 90  Resp:  [18-24] 18  BP: (116-140)/(58-73) 140/67  Body mass index is 26.08 kg/m².  Flowsheet Rows      First Filed Value   Admission Height  180.3 cm (71\") Documented at 02/12/2019 1817   Admission Weight  84.8 kg (187 lb) Documented at 02/12/2019 1826        Vitals:    02/13/19 0724   BP: 140/67   Pulse: 90   Resp: 18   Temp: 98.5 °F (36.9 °C)   SpO2: 97%       Head:    Normocephalic, without obvious abnormality, atraumatic   Eyes:            Lids and lashes normal, conjunctivae and sclerae normal, no   icterus, no pallor   Ears:    Ears appear intact with no abnormalities noted   Throat:   No oral lesions, dentition good   Neck:   No adenopathy, supple, trachea midline, no thyromegaly, no   carotid bruit, no JVD   Lungs:     Breath sounds are equal and clear to auscultation    Heart:    Normal S1 and S2, RRR, No M/G/R   Abdomen:     Normal bowel sounds, no masses, no organomegaly, soft        non-tender, non-distended, no guarding   Extremities:   Moves all extremities well, no edema, no cyanosis, no redness   Pulses:   Pulses palpable and equal bilaterally.    Skin:  Psychiatric:   No bleeding, bruising or rash    Awake, alert and oriented x 3, normal mood and affect              1/31/19             I personally viewed and interpreted the patient's EKG/Telemetry data    Assessment:  Active Hospital Problems    Diagnosis Date Noted   • Pulmonary embolism on right (CMS/[facility]) [I26.99] 02/12/2019   • COPD (chronic obstructive pulmonary disease) (CMS/[facility]) [J44.9] 02/03/2019   • Vocal " cord paralysis [J38.00] 02/03/2019   • Decubitus ulcer of coccyx, unspecified pressure ulcer stage [L89.159] 02/03/2019   • Laryngeal cancer (CMS/[facility]) [C32.9] 01/29/2019   • Dysphagia [R13.10] 01/24/2019   • Diabetes mellitus type 2, uncontrolled (CMS/[facility]) [E11.65] 01/21/2016      Resolved Hospital Problems   No resolved problems to display.       Plan:Chronically ill-appearing gentleman with a history of cancer, recently was hospitalized, that may be the source of his PE because he probably was not very mobile. He has moderate pulmonary embolus, this is not causing right heart strain on him. I think his right heart strain is old and I would just manage him medically with anticoagulation. I do not see any further need for interventional cardiology and we will see him as needed.         Jarad Toledo MD  02/13/19  10:07 AM.

## 2024-05-20 LAB
FUNGUS SPEC CULT: NORMAL
FUNGUS SPEC FUNGUS STN: NORMAL

## 2024-05-20 ASSESSMENT — PAIN SCALES - GENERAL: PAINLEVEL_OUTOF10: 0 - NO PAIN

## 2024-05-20 NOTE — ADDENDUM NOTE
Encounter addended by: Nicole Gramajo RN on: 5/20/2024 9:41 AM   Actions taken: Contacts section saved, Flowsheet accepted

## 2024-05-22 DIAGNOSIS — T81.31XA DEHISCENCE OF OPERATIVE WOUND, INITIAL ENCOUNTER: Primary | ICD-10-CM

## 2024-05-28 ENCOUNTER — OFFICE VISIT (OUTPATIENT)
Dept: PAIN MEDICINE | Facility: CLINIC | Age: 79
End: 2024-05-28
Payer: MEDICARE

## 2024-05-28 DIAGNOSIS — G62.89 OTHER POLYNEUROPATHY: Primary | ICD-10-CM

## 2024-05-28 PROCEDURE — 99213 OFFICE O/P EST LOW 20 MIN: CPT | Performed by: PHYSICAL MEDICINE & REHABILITATION

## 2024-05-28 PROCEDURE — 1125F AMNT PAIN NOTED PAIN PRSNT: CPT | Performed by: PHYSICAL MEDICINE & REHABILITATION

## 2024-05-28 PROCEDURE — 1159F MED LIST DOCD IN RCRD: CPT | Performed by: PHYSICAL MEDICINE & REHABILITATION

## 2024-05-28 ASSESSMENT — PAIN - FUNCTIONAL ASSESSMENT: PAIN_FUNCTIONAL_ASSESSMENT: 0-10

## 2024-05-28 ASSESSMENT — PAIN SCALES - GENERAL: PAINLEVEL_OUTOF10: 6

## 2024-05-28 NOTE — PROGRESS NOTES
Subjective   Patient ID: Amrik Gould is a 78 y.o. male who presents for Peripheral Neuropathy.  HPI    78-year-old male presenting for 11-day follow-up after [employer] peripheral nerve stimulator implantation at the popliteal fossa of the sciatic nerve.  Patient overall doing fairly well though he is having some issues with the device technically as he woke up the other day when the device was on 0 and he is not sure how that happened.  Otherwise no issues with changing the dressing and/or showering.  He is scheduled to see infectious disease next week to see the status of the infection that he had from the previous permanent peripheral nerve stimulator.                Monitoring and compliance:    ORT:    PDUQ:    Office Agreement:      Review of Systems     Current Outpatient Medications   Medication Instructions    Accu-Chek Fastclix Lancet Drum misc 3 times daily,  USE TO TEST AS DIRECTED    Accu-Chek Guide test strips strip 3 times daily,  USE TO TEST BLOOD SUGARS AS DIRECTED    allopurinol (ZYLOPRIM) 100 mg, oral, Daily    ascorbic acid (VITAMIN C) 1,000 mg, oral, Daily    aspirin 81 mg, oral, Daily    atorvastatin (LIPITOR) 40 mg, oral    atorvastatin (LIPITOR) 20 mg, oral, Daily    carvedilol (COREG) 3.125 mg, oral, 2 times daily    carvedilol (COREG) 3.125 mg, oral, 2 times daily (morning and late afternoon)    cholecalciferol (Vitamin D-3) 10 MCG (400 UNIT) tablet oral    cinacalcet (SENSIPAR) 30 mg, oral, Daily, Take with food or shortly afer a meal. Swallow tablet whole; do not break or divide.    clotrimazole-betamethasone (Lotrisone) cream 1 Application, Topical, 2 times daily, to affected area    co-enzyme Q-10 30 mg, oral, Daily    ergocalciferol (VITAMIN D-2) 50,000 Units, oral, Once Weekly    esomeprazole (NEXIUM) 20 mg, oral, Daily before breakfast, Do not open capsule.    gabapentin (NEURONTIN) 200 mg, oral, 2 times daily    krill oil 500 mg capsule oral    midodrine (PROAMATINE) 10 mg, oral, 3  times weekly,  Take one tablet on the way to dialysis. Do not take carvedilol before dialysis.<BR>    mupirocin (Bactroban) 2 % ointment Use three days before and 2 days after surgery    naloxone (Narcan) 4 mg/0.1 mL nasal spray 0.1 mL, nasal, As needed, FOR ACCIDENTAL OVERDOSE    pantoprazole (PROTONIX) 40 mg, oral, Daily    Lesley-Victor Hugo Rx 1- mg-mg-mcg tablet 1 tablet, oral, Daily    sertraline (Zoloft) 50 mg tablet oral    sevelamer carbonate (Renvela) 800 mg tablet 3 tablets, oral, 3 times daily (morning, midday, late afternoon)    sodium chloride 0.9% parenteral solution 100 mL with cefepime 2 gram recon soln 1 g 1 g, intravenous, 3 times weekly    sorbitol 70 % solution 30 mL, oral, Every 4 hours PRN, until loos BM then prn<BR>    topiramate (TOPAMAX) 50 mg, oral,  in addition to regular prescription after each dialysis session<BR>    traMADol (ULTRAM) 50 mg, oral, As needed, after each dialysis session    vancomycin/0.9 % sod chloride (vancomycin in 0.9 % sodium chl) 1 gram/250 mL solution intravenous    zinc gluconate 50 mg tablet 50 mg of elemental zinc, oral, Daily        Past Medical History:   Diagnosis Date    Amblyopia     Arthritis     Cataract     Delayed emergence from general anesthesia     Diabetes mellitus (Multi)     Hemodialysis status (CMS-HCC)     Obstructive sleep apnea (adult) (pediatric) 07/09/2014    Obstructive sleep apnea    Other conditions influencing health status     Osteoarthritis    Other conditions influencing health status     Coronary Artery Disease    Personal history of other diseases of the circulatory system     History of hypertension    Personal history of other diseases of the digestive system     History of esophageal reflux    Personal history of other diseases of the musculoskeletal system and connective tissue     Personal history of gout    Personal history of other diseases of the nervous system and sense organs     History of cataract    Personal history of  other diseases of urinary system     History of chronic kidney disease    Personal history of other endocrine, nutritional and metabolic disease     History of diabetes mellitus    Personal history of other endocrine, nutritional and metabolic disease     History of hyperlipidemia    Stroke (Multi)         Past Surgical History:   Procedure Laterality Date    AV FISTULA PLACEMENT Left     left upper medial arm    CATARACT EXTRACTION Bilateral     CHOLECYSTECTOMY      CORONARY ARTERY BYPASS GRAFT      quadruple    CORONARY STENT PLACEMENT      x2    KNEE ARTHROSCOPY W/ DEBRIDEMENT Bilateral     LUMBAR FUSION  07/09/2014    Lumbar Vertebral Fusion    OTHER SURGICAL HISTORY  09/29/2014    Neurological [place]/Pump Implantation Of Device Drug Inf    OTHER SURGICAL HISTORY  07/31/2019    Cataract surgery    SPINE SURGERY  07/09/2014    Spine Repair    VASCULAR SURGERY      left leg stent placement        Family History   Problem Relation Name Age of Onset    Cancer Father Amrik     Diabetes Father Amrik         No Known Allergies     Objective     There were no vitals filed for this visit.     Physical Exam    GENERAL EXAM  Vital Signs: Vital signs to include heart rate, respiration rate, blood pressure, and temperature were reviewed.  General Appearance:  Awake, alert, healthy appearing, well developed, No acute distress.  Head: Normocephalic without evidence of head injury.  Neck: The appearance of the neck was normal without swelling with a midline trachea.  Eyes: The eyelids and eyebrows exhibited no abnormalities.  Pupils were not pin-point.  Sclera was without icterus.  Lungs: Respiration rhythm and depth was normal.  Respiratory movements were normal without labored breathing.  Cardiovascular: No peripheral edema was present.    Neurological: Patient was oriented to time, place, and person.  Speech was normal.  Balance, gait, and stance were unremarkable.    Psychiatric: Appearance was normal with appropriate  dress.  Mood was euthymic and affect was normal.  Skin: Affected regions were without ecchymosis or skin lesions.        Physical exam as above except:  Presenting in manual wheelchair with left lower extremity AFO in place.  Dressing clean dry and intact to right popliteal fossa at the site of the peripheral nerve stimulator lead.      Assessment/Plan   Diagnoses and all orders for this visit:  Other polyneuropathy    78-year-old male with peripheral neuropathy with doing well overall 11 days post Sprint sciatic nerve implantation.  No issues with the dressing and no infection concern.  Overall patient doing fairly well but I do think he is having some technical issues so I will have the [employer] representatives reach out to him to troubleshoot some of the device the issues he is having.  Otherwise we will see him again at the 60-day oni to pull the leads that we could potentially extend that for another couple of weeks depending on long-term infection concerns.       This note was generated with the aid of dictation software, there may be typos despite my attempts at proofreading.

## 2024-06-28 ENCOUNTER — APPOINTMENT (OUTPATIENT)
Dept: INFECTIOUS DISEASES | Facility: CLINIC | Age: 79
End: 2024-06-28
Payer: MEDICARE

## 2024-07-10 ENCOUNTER — APPOINTMENT (OUTPATIENT)
Dept: INFECTIOUS DISEASES | Facility: CLINIC | Age: 79
End: 2024-07-10
Payer: MEDICARE

## 2024-07-16 ENCOUNTER — APPOINTMENT (OUTPATIENT)
Dept: PAIN MEDICINE | Facility: CLINIC | Age: 79
End: 2024-07-16
Payer: MEDICARE

## 2024-07-16 VITALS — HEART RATE: 76 BPM | DIASTOLIC BLOOD PRESSURE: 77 MMHG | SYSTOLIC BLOOD PRESSURE: 122 MMHG | RESPIRATION RATE: 16 BRPM

## 2024-07-16 DIAGNOSIS — G62.89 OTHER POLYNEUROPATHY: Primary | ICD-10-CM

## 2024-07-16 PROCEDURE — 3078F DIAST BP <80 MM HG: CPT | Performed by: PHYSICAL MEDICINE & REHABILITATION

## 2024-07-16 PROCEDURE — 1126F AMNT PAIN NOTED NONE PRSNT: CPT | Performed by: PHYSICAL MEDICINE & REHABILITATION

## 2024-07-16 PROCEDURE — 99213 OFFICE O/P EST LOW 20 MIN: CPT | Performed by: PHYSICAL MEDICINE & REHABILITATION

## 2024-07-16 PROCEDURE — 3074F SYST BP LT 130 MM HG: CPT | Performed by: PHYSICAL MEDICINE & REHABILITATION

## 2024-07-16 ASSESSMENT — PAIN SCALES - GENERAL: PAINLEVEL: 0-NO PAIN

## 2024-07-16 NOTE — PROGRESS NOTES
Subjective   Patient ID: Amrik Gould is a 79 y.o. male who presents for Follow-up.  HPI    79-year-old male with complex past medical history including end-stage renal disease significant arterial disease and neuropathic pain in his legs.  We had placed a Sprint temporary peripheral nerve stimulator about 55 days ago with good relief of his symptoms greater than 50% however last week patient was having worsening of his symptoms.  However today he is back to baseline and is continue to have improvement.  He is close to the oni where we will be pulling the leads but him and his wife would like to potentially leave the leads in for a little bit longer.  He was seen by infectious disease about a month and a half ago who cleared him after the [employer]on[employer] sciatic nerve infection that had to be removed by my partner Dr. Mackey a couple of months ago.  Otherwise no issues changing dressing.  No drainage or surrounding erythema no fevers chills or night sweats.                Monitoring and compliance:    ORT:    PDUQ:    Office Agreement:      Review of Systems     Current Outpatient Medications   Medication Instructions    Accu-Chek Fastclix Lancet Drum misc 3 times daily,  USE TO TEST AS DIRECTED    Accu-Chek Guide test strips strip 3 times daily,  USE TO TEST BLOOD SUGARS AS DIRECTED    allopurinol (ZYLOPRIM) 100 mg, oral, Daily    ascorbic acid (VITAMIN C) 1,000 mg, oral, Daily    aspirin 81 mg, oral, Daily    atorvastatin (LIPITOR) 40 mg, oral    atorvastatin (LIPITOR) 20 mg, oral, Daily    carvedilol (COREG) 3.125 mg, oral, 2 times daily    carvedilol (COREG) 3.125 mg, oral, 2 times daily (morning and late afternoon)    cholecalciferol (Vitamin D-3) 10 MCG (400 UNIT) tablet oral    cinacalcet (SENSIPAR) 30 mg, oral, Daily, Take with food or shortly afer a meal. Swallow tablet whole; do not break or divide.    clotrimazole-betamethasone (Lotrisone) cream 1 Application, Topical, 2 times daily, to affected area    co-enzyme  Q-10 30 mg, oral, Daily    ergocalciferol (VITAMIN D-2) 50,000 Units, oral, Once Weekly    esomeprazole (NEXIUM) 20 mg, oral, Daily before breakfast, Do not open capsule.    gabapentin (NEURONTIN) 200 mg, oral, 2 times daily    krill oil 500 mg capsule oral    midodrine (PROAMATINE) 10 mg, oral, 3 times weekly,  Take one tablet on the way to dialysis. Do not take carvedilol before dialysis.<BR>    mupirocin (Bactroban) 2 % ointment Use three days before and 2 days after surgery    naloxone (Narcan) 4 mg/0.1 mL nasal spray 0.1 mL, nasal, As needed, FOR ACCIDENTAL OVERDOSE    pantoprazole (PROTONIX) 40 mg, oral, Daily    Lelsey-Victor Hugo Rx 1- mg-mg-mcg tablet 1 tablet, oral, Daily    sertraline (Zoloft) 50 mg tablet oral    sevelamer carbonate (Renvela) 800 mg tablet 3 tablets, oral, 3 times daily (morning, midday, late afternoon)    sodium chloride 0.9% parenteral solution 100 mL with cefepime 2 gram recon soln 1 g 1 g, intravenous, 3 times weekly    sorbitol 70 % solution 30 mL, oral, Every 4 hours PRN, until loos BM then prn<BR>    topiramate (TOPAMAX) 50 mg, oral,  in addition to regular prescription after each dialysis session<BR>    traMADol (ULTRAM) 50 mg, oral, As needed, after each dialysis session    vancomycin/0.9 % sod chloride (vancomycin in 0.9 % sodium chl) 1 gram/250 mL solution intravenous    zinc gluconate 50 mg tablet 50 mg of elemental zinc, oral, Daily        Past Medical History:   Diagnosis Date    Amblyopia     Arthritis     Cataract     Delayed emergence from general anesthesia     Diabetes mellitus (Multi)     Hemodialysis status (CMS-HCC)     Obstructive sleep apnea (adult) (pediatric) 07/09/2014    Obstructive sleep apnea    Other conditions influencing health status     Osteoarthritis    Other conditions influencing health status     Coronary Artery Disease    Personal history of other diseases of the circulatory system     History of hypertension    Personal history of other diseases of  the digestive system     History of esophageal reflux    Personal history of other diseases of the musculoskeletal system and connective tissue     Personal history of gout    Personal history of other diseases of the nervous system and sense organs     History of cataract    Personal history of other diseases of urinary system     History of chronic kidney disease    Personal history of other endocrine, nutritional and metabolic disease     History of diabetes mellitus    Personal history of other endocrine, nutritional and metabolic disease     History of hyperlipidemia    Stroke (Multi)         Past Surgical History:   Procedure Laterality Date    AV FISTULA PLACEMENT Left     left upper medial arm    CATARACT EXTRACTION Bilateral     CHOLECYSTECTOMY      CORONARY ARTERY BYPASS GRAFT      quadruple    CORONARY STENT PLACEMENT      x2    KNEE ARTHROSCOPY W/ DEBRIDEMENT Bilateral     LUMBAR FUSION  07/09/2014    Lumbar Vertebral Fusion    OTHER SURGICAL HISTORY  09/29/2014    Neurological [place]/Pump Implantation Of Device Drug Inf    OTHER SURGICAL HISTORY  07/31/2019    Cataract surgery    SPINE SURGERY  07/09/2014    Spine Repair    VASCULAR SURGERY      left leg stent placement        Family History   Problem Relation Name Age of Onset    Cancer Father Amrik     Diabetes Father Amrik         No Known Allergies     Objective     Vitals:    07/16/24 1124   BP: 122/77   Pulse: 76   Resp: 16        Physical Exam    GENERAL EXAM  Vital Signs: Vital signs to include heart rate, respiration rate, blood pressure, and temperature were reviewed.  General Appearance:  Awake, alert, healthy appearing, well developed, No acute distress.  Head: Normocephalic without evidence of head injury.  Neck: The appearance of the neck was normal without swelling with a midline trachea.  Eyes: The eyelids and eyebrows exhibited no abnormalities.  Pupils were not pin-point.  Sclera was without icterus.  Lungs: Respiration rhythm and  depth was normal.  Respiratory movements were normal without labored breathing.  Cardiovascular: No peripheral edema was present.    Neurological: Patient was oriented to time, place, and person.  Speech was normal.  Balance, gait, and stance were unremarkable.    Psychiatric: Appearance was normal with appropriate dress.  Mood was euthymic and affect was normal.  Skin: Affected regions were without ecchymosis or skin lesions.        Physical exam as above except:  Right popliteal fossa with [employer] peripheral nerve stimulator dressing in place.  No surrounding erythema or drainage to the lead itself.      Assessment/Plan   Diagnoses and all orders for this visit:  Other polyneuropathy    Patient doing well with [employer] peripheral nerve stimulator to the right sciatic nerve.  After lengthy discussion we discussed that off-label we can leave this device in for up to 90 days.  We agreed with myself the patient and his wife that we would leave this in until the 90-day oni with the understanding they will call us if there is any issues that they see with the lead when she is changing his dressing.  At that point we will pull the lead and reassess his pain shortly after that       This note was generated with the aid of dictation software, there may be typos despite my attempts at proofreading.

## 2024-07-25 ENCOUNTER — APPOINTMENT (OUTPATIENT)
Dept: PAIN MEDICINE | Facility: CLINIC | Age: 79
End: 2024-07-25
Payer: MEDICARE

## 2024-08-13 ENCOUNTER — APPOINTMENT (OUTPATIENT)
Dept: PAIN MEDICINE | Facility: CLINIC | Age: 79
End: 2024-08-13
Payer: MEDICARE

## 2024-08-20 ENCOUNTER — APPOINTMENT (OUTPATIENT)
Dept: PAIN MEDICINE | Facility: CLINIC | Age: 79
End: 2024-08-20
Payer: MEDICARE

## 2024-08-20 DIAGNOSIS — E11.42 DIABETIC PERIPHERAL NEUROPATHY (MULTI): Primary | ICD-10-CM

## 2024-08-20 DIAGNOSIS — Z97.8 PRESENCE OF INTRATHECAL PUMP: ICD-10-CM

## 2024-08-20 PROCEDURE — 99214 OFFICE O/P EST MOD 30 MIN: CPT | Performed by: PHYSICAL MEDICINE & REHABILITATION

## 2024-08-20 NOTE — PROGRESS NOTES
Subjective   Patient ID: Amrik Gould is a 79 y.o. male who presents for No chief complaint on file..  HPI    Patient presents today for follow-up after [employer] peripheral nerve stimulator implant to the sciatic nerve at the popliteal fossa.  Patient did well, and continues to have great relief.  No erythema, induration, chills or fevers.    Review of Systems   13-point ROS done and negative except for HPI.       Current Outpatient Medications:     Accu-Chek Fastclix Lancet Drum misc, 3 times a day.  USE TO TEST AS DIRECTED, Disp: , Rfl:     Accu-Chek Guide test strips strip, 3 times a day.  USE TO TEST BLOOD SUGARS AS DIRECTED, Disp: , Rfl:     allopurinol (Zyloprim) 100 mg tablet, Take 1 tablet (100 mg) by mouth once daily., Disp: , Rfl:     ascorbic acid (Vitamin C) 500 mg ER capsule, Take 2 capsules (1,000 mg) by mouth once daily., Disp: , Rfl:     aspirin 81 mg chewable tablet, Chew 1 tablet (81 mg) once daily., Disp: , Rfl:     atorvastatin (Lipitor) 20 mg tablet, Take 1 tablet (20 mg) by mouth once daily., Disp: , Rfl:     atorvastatin (Lipitor) 40 mg tablet, Take 1 tablet (40 mg) by mouth., Disp: , Rfl:     carvedilol (Coreg) 12.5 mg tablet, Take 3.125 mg by mouth 2 times a day with meals., Disp: , Rfl:     carvedilol (Coreg) 3.125 mg tablet, Take 1 tablet (3.125 mg) by mouth 2 times a day., Disp: , Rfl:     cholecalciferol (Vitamin D-3) 10 MCG (400 UNIT) tablet, Take by mouth., Disp: , Rfl:     cinacalcet (Sensipar) 30 mg tablet, Take 1 tablet (30 mg) by mouth once daily. Take with food or shortly afer a meal. Swallow tablet whole; do not break or divide., Disp: , Rfl:     clotrimazole-betamethasone (Lotrisone) cream, Apply 1 Application topically 2 times a day. to affected area, Disp: , Rfl:     co-enzyme Q-10 30 mg capsule, Take 1 capsule (30 mg) by mouth once daily., Disp: , Rfl:     ergocalciferol (Vitamin D-2) 1.25 MG (06966 UT) capsule, Take 1 capsule (50,000 Units) by mouth 1 (one) time per week.,  Disp: , Rfl:     esomeprazole (NexIUM) 20 mg DR capsule, Take 1 capsule (20 mg) by mouth once daily in the morning. Take before meals. Do not open capsule., Disp: , Rfl:     gabapentin (Neurontin) 100 mg capsule, Take 2 capsules (200 mg) by mouth twice a day., Disp: , Rfl:     krill oil 500 mg capsule, Take by mouth., Disp: , Rfl:     midodrine (Proamatine) 10 mg tablet, Take 1 tablet (10 mg) by mouth 3 (three) times a week.  Take one tablet on the way to dialysis. Do not take carvedilol before dialysis., Disp: , Rfl:     mupirocin (Bactroban) 2 % ointment, Use three days before and 2 days after surgery, Disp: 15 g, Rfl: 0    naloxone (Narcan) 4 mg/0.1 mL nasal spray, Administer 1 spray (4 mg) into affected nostril(s) if needed. FOR ACCIDENTAL OVERDOSE, Disp: , Rfl:     pantoprazole (ProtoNix) 40 mg EC tablet, Take 1 tablet (40 mg) by mouth once daily., Disp: , Rfl:     Lesley-Victor Hugo Rx 1- mg-mg-mcg tablet, Take 1 tablet by mouth once daily., Disp: , Rfl:     sertraline (Zoloft) 50 mg tablet, Take by mouth., Disp: , Rfl:     sevelamer carbonate (Renvela) 800 mg tablet, Take 3 tablets (2,400 mg) by mouth 3 times a day with meals., Disp: , Rfl:     sodium chloride 0.9% parenteral solution 100 mL with cefepime 2 gram recon soln 1 g, Infuse 1 g into a venous catheter 3 times a week., Disp: , Rfl:     sorbitol 70 % solution, Take 30 mL by mouth every 4 hours if needed. until loos BM then prn, Disp: , Rfl:     topiramate (Topamax) 50 mg tablet, Take 1 tablet (50 mg) by mouth.  in addition to regular prescription after each dialysis session, Disp: , Rfl:     traMADol (Ultram) 50 mg tablet, Take 1 tablet (50 mg) by mouth if needed. after each dialysis session, Disp: , Rfl:     vancomycin/0.9 % sod chloride (vancomycin in 0.9 % sodium chl) 1 gram/250 mL solution, Infuse into a venous catheter., Disp: , Rfl:     zinc gluconate 50 mg tablet, Take 1 tablet (50 mg of elemental zinc) by mouth once daily., Disp: , Rfl:      Past  Medical History:   Diagnosis Date    Amblyopia     Arthritis     Cataract     Delayed emergence from general anesthesia     Diabetes mellitus (Multi)     Hemodialysis status (CMS-[facility])     Obstructive sleep apnea (adult) (pediatric) 07/09/2014    Obstructive sleep apnea    Other conditions influencing health status     Osteoarthritis    Other conditions influencing health status     Coronary Artery Disease    Personal history of other diseases of the circulatory system     History of hypertension    Personal history of other diseases of the digestive system     History of esophageal reflux    Personal history of other diseases of the musculoskeletal system and connective tissue     Personal history of gout    Personal history of other diseases of the nervous system and sense organs     History of cataract    Personal history of other diseases of urinary system     History of chronic kidney disease    Personal history of other endocrine, nutritional and metabolic disease     History of diabetes mellitus    Personal history of other endocrine, nutritional and metabolic disease     History of hyperlipidemia    Stroke (Multi)         Past Surgical History:   Procedure Laterality Date    AV FISTULA PLACEMENT Left     left upper medial arm    CATARACT EXTRACTION Bilateral     CHOLECYSTECTOMY      CORONARY ARTERY BYPASS GRAFT      quadruple    CORONARY STENT PLACEMENT      x2    KNEE ARTHROSCOPY W/ DEBRIDEMENT Bilateral     LUMBAR FUSION  07/09/2014    Lumbar Vertebral Fusion    OTHER SURGICAL HISTORY  09/29/2014    Neurological [place]/Pump Implantation Of Device Drug Inf    OTHER SURGICAL HISTORY  07/31/2019    Cataract surgery    SPINE SURGERY  07/09/2014    Spine Repair    VASCULAR SURGERY      left leg stent placement        Family History   Problem Relation Name Age of Onset    Cancer Father Amrik     Diabetes Father Amrik         No Known Allergies     Objective     There were no vitals filed for this visit.      Physical Exam    GENERAL EXAM  Vital Signs: Vital signs to include heart rate, respiration rate, blood pressure, and temperature were reviewed.  General Appearance:  Awake, alert, healthy appearing, well developed, No acute distress.  Head: Normocephalic without evidence of head injury.  Neck: The appearance of the neck was normal without swelling with a midline trachea.  Eyes: The eyelids and eyebrows exhibited no abnormalities.  Pupils were not pin-point.  Sclera was without icterus.  Lungs: Respiration rhythm and depth was normal.  Respiratory movements were normal without labored breathing.  Cardiovascular: No peripheral edema was present.    Neurological: Patient was oriented to time, place, and person.  Speech was normal.  Balance, gait, and stance were unremarkable.    Psychiatric: Appearance was normal with appropriate dress.  Mood was euthymic and affect was normal.  Skin: Affected regions were without ecchymosis or skin lesions.  Implied sites was clean, dry, and intact.      Assessment/Plan   Diagnoses and all orders for this visit:  Diabetic peripheral neuropathy (Multi)  Presence of intrathecal pump      Mr. Amrik Gould is a 79-year-old male with peripheral neuropathy who had Sprint peripheral nerve stimulation implanted to the right sciatic nerve at the popliteal fossa for right foot peripheral neuropathy.  Patient continues to have great (> 75%) pain relief.  Implant was removed completely with the tip intact today.  Site was clean and dry.  Of note, we will reach out to Dr. Mackey's nurse regarding [facility] medication pump refill.

## 2024-08-26 ENCOUNTER — APPOINTMENT (OUTPATIENT)
Dept: OPHTHALMOLOGY | Facility: CLINIC | Age: 79
End: 2024-08-26
Payer: MEDICARE

## 2024-08-26 DIAGNOSIS — H52.223 REGULAR ASTIGMATISM OF BOTH EYES WITH PRESBYOPIA: ICD-10-CM

## 2024-08-26 DIAGNOSIS — Z96.1 PSEUDOPHAKIA OF BOTH EYES: ICD-10-CM

## 2024-08-26 DIAGNOSIS — H53.002 AMBLYOPIA OF LEFT EYE: Primary | ICD-10-CM

## 2024-08-26 DIAGNOSIS — H52.4 REGULAR ASTIGMATISM OF BOTH EYES WITH PRESBYOPIA: ICD-10-CM

## 2024-08-26 PROBLEM — H26.9 CATARACT: Status: RESOLVED | Noted: 2023-10-14 | Resolved: 2024-08-26

## 2024-08-26 PROBLEM — Z97.3 WEARS EYEGLASSES: Status: RESOLVED | Noted: 2023-10-14 | Resolved: 2024-08-26

## 2024-08-26 PROCEDURE — 92015 DETERMINE REFRACTIVE STATE: CPT | Performed by: STUDENT IN AN ORGANIZED HEALTH CARE EDUCATION/TRAINING PROGRAM

## 2024-08-26 PROCEDURE — 99212 OFFICE O/P EST SF 10 MIN: CPT | Performed by: STUDENT IN AN ORGANIZED HEALTH CARE EDUCATION/TRAINING PROGRAM

## 2024-08-26 ASSESSMENT — ENCOUNTER SYMPTOMS
PSYCHIATRIC NEGATIVE: 0
GASTROINTESTINAL NEGATIVE: 0
ALLERGIC/IMMUNOLOGIC NEGATIVE: 0
ENDOCRINE NEGATIVE: 0
NEUROLOGICAL NEGATIVE: 0
CARDIOVASCULAR NEGATIVE: 0
EYES NEGATIVE: 0
HEMATOLOGIC/LYMPHATIC NEGATIVE: 0
MUSCULOSKELETAL NEGATIVE: 0
RESPIRATORY NEGATIVE: 0
CONSTITUTIONAL NEGATIVE: 0

## 2024-08-26 ASSESSMENT — VISUAL ACUITY
CORRECTION_TYPE: GLASSES
OS_CC+: -2
METHOD: SNELLEN - LINEAR
OS_CC: 20/40
OD_CC+: -2
OD_CC: 20/25

## 2024-08-26 ASSESSMENT — CONF VISUAL FIELD
OS_SUPERIOR_NASAL_RESTRICTION: 0
OS_INFERIOR_TEMPORAL_RESTRICTION: 0
OS_INFERIOR_NASAL_RESTRICTION: 0
OS_NORMAL: 1
OD_INFERIOR_NASAL_RESTRICTION: 0
OD_SUPERIOR_TEMPORAL_RESTRICTION: 0
OD_INFERIOR_TEMPORAL_RESTRICTION: 0
OS_SUPERIOR_TEMPORAL_RESTRICTION: 0
OD_SUPERIOR_NASAL_RESTRICTION: 0
OD_NORMAL: 1

## 2024-08-26 ASSESSMENT — REFRACTION_MANIFEST
OD_SPHERE: +0.25
OS_AXIS: 170
OS_SPHERE: -0.50
OD_CYLINDER: +1.00
OS_ADD: +3.00
OS_CYLINDER: +1.25
OD_ADD: +3.00
OD_AXIS: 166

## 2024-08-26 ASSESSMENT — CUP TO DISC RATIO
OD_RATIO: 0.6
OS_RATIO: 0.6

## 2024-08-26 ASSESSMENT — PACHYMETRY
OD_CT(UM): 622
OS_CT(UM): 644

## 2024-08-26 ASSESSMENT — TONOMETRY
IOP_METHOD: GOLDMANN APPLANATION
OS_IOP_MMHG: 12
OD_IOP_MMHG: 14

## 2024-08-26 ASSESSMENT — EXTERNAL EXAM - LEFT EYE: OS_EXAM: NORMAL

## 2024-08-26 ASSESSMENT — REFRACTION_WEARINGRX
OD_AXIS: 166
OD_ADD: +3.00
OS_AXIS: 170
OD_SPHERE: PLANO
OS_ADD: +3.00
OS_SPHERE: -0.50
OS_CYLINDER: +1.50
OD_CYLINDER: +1.25

## 2024-08-26 ASSESSMENT — SLIT LAMP EXAM - LIDS: COMMENTS: GOOD POSITION

## 2024-08-26 ASSESSMENT — EXTERNAL EXAM - RIGHT EYE: OD_EXAM: NORMAL

## 2024-08-26 NOTE — PROGRESS NOTES
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

## 2024-09-16 ENCOUNTER — APPOINTMENT (OUTPATIENT)
Dept: OPHTHALMOLOGY | Facility: CLINIC | Age: 79
End: 2024-09-16
Payer: MEDICARE

## 2024-09-17 ENCOUNTER — APPOINTMENT (OUTPATIENT)
Dept: PAIN MEDICINE | Facility: CLINIC | Age: 79
End: 2024-09-17
Payer: MEDICARE

## 2024-09-17 DIAGNOSIS — E11.42 DIABETIC PERIPHERAL NEUROPATHY (MULTI): ICD-10-CM

## 2024-09-17 DIAGNOSIS — G57.71 CAUSALGIA OF RIGHT LOWER EXTREMITY: ICD-10-CM

## 2024-09-17 DIAGNOSIS — Z79.899 ENCOUNTER FOR LONG-TERM (CURRENT) USE OF HIGH-RISK MEDICATION: ICD-10-CM

## 2024-09-17 DIAGNOSIS — Z97.8 PRESENCE OF INTRATHECAL PUMP: Primary | ICD-10-CM

## 2024-09-17 PROCEDURE — 62370 ANL SP INF PMP W/MDREPRG&FIL: CPT | Performed by: NURSE PRACTITIONER

## 2024-09-17 RX ORDER — FENTANYL CITRATE 50 UG/ML
2000 INJECTION, SOLUTION INTRAMUSCULAR; INTRAVENOUS ONCE
Status: SHIPPED | OUTPATIENT
Start: 2024-09-17

## 2024-09-17 NOTE — PROGRESS NOTES
History Of Present Illness  Amrik Gould is a 79 y.o. male presents for IDDS (intrathecal drug delivery system) refill.     Pain states pain is overall well controlled with pump settings. Reports no changes to medical health, no concerns for infection. Pump log reviewed with no major issues/stalls. The pain causes significant stress in the patient's life, specifically interferes with general activity, mood, walking ability, ability to perform tasks at home and/or work. Denies any bowel or bladder incontinence, saddle anesthesia, worsening pain, weakness or falls.     Past Medical History  He has a past medical history of Amblyopia, Arthritis, Cataract, Delayed emergence from general anesthesia, Diabetes mellitus (Multi), Hemodialysis status (CMS-HCC), Obstructive sleep apnea (adult) (pediatric) (07/09/2014), Other conditions influencing health status, Other conditions influencing health status, Personal history of other diseases of the circulatory system, Personal history of other diseases of the digestive system, Personal history of other diseases of the musculoskeletal system and connective tissue, Personal history of other diseases of the nervous system and sense organs, Personal history of other diseases of urinary system, Personal history of other endocrine, nutritional and metabolic disease, Personal history of other endocrine, nutritional and metabolic disease, and Stroke (Multi).    Surgical History  He has a past surgical history that includes Other surgical history (09/29/2014); Other surgical history (07/31/2019); Lumbar fusion (07/09/2014); Spine surgery (07/09/2014); Coronary artery bypass graft; Coronary stent placement; Cataract extraction (Bilateral); Vascular surgery; Cholecystectomy; Knee arthroscopy w/ debridement (Bilateral); and AV fistula placement (Left).     Social History  He reports that he has never smoked. He has never used smokeless tobacco. He reports that he does not drink alcohol and  does not use drugs.    Family History  Family History   Problem Relation Name Age of Onset    Cancer Father Amrik     Diabetes Father Amrik         Allergies  Patient has no known allergies.    Review of Symptoms:   Constitutional: Negative for chills, diaphoresis or fever  HENT: Negative for neck swelling  Eyes:.  Negative for eye pain  Respiratory:.  Negative for cough, shortness of breath or wheezing    Cardiovascular:.  Negative for chest pain or palpitations  Gastrointestinal:.  Negative for abdominal pain, nausea and vomiting  Genitourinary:.  Negative for urgency  Musculoskeletal:  GUAJARDO  Skin: Negative for wounds or itching   Neurological: Negative for dizziness, seizures, loss of consciousness and weakness  Endo/Heme/Allergies: Does not bruise/bleed easily  Psychiatric/Behavioral: Negative for depression. The patient does not appear anxious.       PHYSICAL EXAM  Constitutional:       General: Not in acute distress     Appearance: Normal appearance. Not ill-appearing.  HENT:     Head: Normocephalic and atraumatic  Eyes:     Conjunctiva/sclera: Conjunctivae normal  Cardiovascular:     Rate and Rhythm: Normal rate and regular rhythm  Pulmonary:     Effort: No respiratory distress  Abdominal:     Palpations: Abdomen is soft  Musculoskeletal: GUAJARDO  Skin:     General: Skin is warm and dry  Neurological:     General: No focal deficit present  Psychiatric:         Mood and Affect: Mood normal         Behavior: Behavior normal  Pump site clean dry intact with no signs of infection    Relevant Results  Current Outpatient Medications   Medication Instructions    Accu-Chek Fastclix Lancet Drum misc 3 times daily,  USE TO TEST AS DIRECTED    Accu-Chek Guide test strips strip 3 times daily,  USE TO TEST BLOOD SUGARS AS DIRECTED    allopurinol (ZYLOPRIM) 100 mg, oral, Daily    ascorbic acid (VITAMIN C) 1,000 mg, oral, Daily    aspirin 81 mg, oral, Daily    atorvastatin (LIPITOR) 40 mg, oral    atorvastatin (LIPITOR) 20 mg,  oral, Daily    carvedilol (COREG) 3.125 mg, oral, 2 times daily    carvedilol (COREG) 3.125 mg, oral, 2 times daily (morning and late afternoon)    cholecalciferol (Vitamin D-3) 10 MCG (400 UNIT) tablet oral    cinacalcet (SENSIPAR) 30 mg, oral, Daily, Take with food or shortly afer a meal. Swallow tablet whole; do not break or divide.    clotrimazole-betamethasone (Lotrisone) cream 1 Application, Topical, 2 times daily, to affected area    co-enzyme Q-10 30 mg, oral, Daily    ergocalciferol (VITAMIN D-2) 50,000 Units, oral, Once Weekly    esomeprazole (NEXIUM) 20 mg, oral, Daily before breakfast, Do not open capsule.    gabapentin (NEURONTIN) 200 mg, oral, 2 times daily    krill oil 500 mg capsule oral    midodrine (PROAMATINE) 10 mg, oral, 3 times weekly,  Take one tablet on the way to dialysis. Do not take carvedilol before dialysis.<BR>    mupirocin (Bactroban) 2 % ointment Use three days before and 2 days after surgery    naloxone (Narcan) 4 mg/0.1 mL nasal spray 0.1 mL, nasal, As needed, FOR ACCIDENTAL OVERDOSE    pantoprazole (PROTONIX) 40 mg, oral, Daily    Lesley-Victor Hugo Rx 1- mg-mg-mcg tablet 1 tablet, oral, Daily    sertraline (Zoloft) 50 mg tablet oral    sevelamer carbonate (Renvela) 800 mg tablet 3 tablets, oral, 3 times daily (morning, midday, late afternoon)    sodium chloride 0.9% parenteral solution 100 mL with cefepime 2 gram recon soln 1 g 1 g, intravenous, 3 times weekly    sorbitol 70 % solution 30 mL, oral, Every 4 hours PRN, until loos BM then prn<BR>    topiramate (TOPAMAX) 50 mg, oral,  in addition to regular prescription after each dialysis session<BR>    traMADol (ULTRAM) 50 mg, oral, As needed, after each dialysis session    vancomycin/0.9 % sod chloride (vancomycin in 0.9 % sodium chl) 1 gram/250 mL solution intravenous    zinc gluconate 50 mg tablet 50 mg of elemental zinc, oral, Daily      Encounter Diagnoses   Name Primary?    Encounter for long-term (current) use of high-risk  medication     Presence of intrathecal pump Yes    Causalgia of right lower extremity     Diabetic peripheral neuropathy (Multi)          1. Encounter for long-term (current) use of high-risk medication  Opiate/Opioid/Benzo Prescription Compliance           Procedures   Informed consent obtained. The area over the intrathecal pump was exposed, prepped with chlorprep, and draped in the usual sterile fashion. Using a [employer] refill kit, a 22-gauge noncoring needle was inserted skin and into the refill diaphragm. 9 ml of nonbloody non-turbid solution was aspirated from the reservoir and discarded; per pump data, 3.2 ml was expected. Thereafter 40 ml of an equal concentration fresh solution was then injected back into the pump. Confirmation of needle in reservoir through intermittent serial aspirations. The needle was removed and a bandage was applied. The patient tolerated the procedure well with no noted complications.     ASSESSMENT/PLAN  Amrik Gould is a 79 y.o. male presents for ITP refill.   Electronic analysis of ITP for drug infusion with reprogramming and refill completed today with no noted complications.    Pump Settings:  SynchroMed II Medtronic  Fentanyl 50 mcg/ml  Marcaine 30 mg/ml  40 ml pump     Continuous infusion 21.496 Âµg per day of fentanyl and 12.898 mg/day of bupivacaine     PTM. 0.755 Âµg per bolus  Max activations: 4/day     Next refill 12/3/24  STEVEN: January 2028    Our plan is as follows:  - Next Pump Refill: 12/3/24  -Continue pain medications as prescribed  -Pt plans to be in [facility] Nov.-April.       LIZZETH Villagomez-CNP

## 2024-10-29 ENCOUNTER — APPOINTMENT (OUTPATIENT)
Dept: OPHTHALMOLOGY | Facility: CLINIC | Age: 79
End: 2024-10-29
Payer: MEDICARE

## 2024-10-29 DIAGNOSIS — E11.3493 TYPE 2 DIABETES MELLITUS WITH BOTH EYES AFFECTED BY SEVERE NONPROLIFERATIVE RETINOPATHY WITHOUT MACULAR EDEMA, WITHOUT LONG-TERM CURRENT USE OF INSULIN: Primary | ICD-10-CM

## 2024-10-29 DIAGNOSIS — E11.42 DIABETIC PERIPHERAL NEUROPATHY (MULTI): ICD-10-CM

## 2024-10-29 PROCEDURE — 99213 OFFICE O/P EST LOW 20 MIN: CPT

## 2024-10-29 PROCEDURE — 92134 CPTRZ OPH DX IMG PST SGM RTA: CPT

## 2024-10-29 RX ORDER — FOLIC ACID/VIT B COMPLEX AND C 0.8 MG
TABLET ORAL
COMMUNITY
Start: 2024-10-09

## 2024-10-29 ASSESSMENT — EXTERNAL EXAM - LEFT EYE: OS_EXAM: NORMAL

## 2024-10-29 ASSESSMENT — ENCOUNTER SYMPTOMS
NEUROLOGICAL NEGATIVE: 0
EYES NEGATIVE: 1
ENDOCRINE NEGATIVE: 0
HEMATOLOGIC/LYMPHATIC NEGATIVE: 0
ALLERGIC/IMMUNOLOGIC NEGATIVE: 0
CARDIOVASCULAR NEGATIVE: 0
RESPIRATORY NEGATIVE: 0
CONSTITUTIONAL NEGATIVE: 0
MUSCULOSKELETAL NEGATIVE: 0
GASTROINTESTINAL NEGATIVE: 0
PSYCHIATRIC NEGATIVE: 0

## 2024-10-29 ASSESSMENT — CONF VISUAL FIELD
OD_SUPERIOR_NASAL_RESTRICTION: 0
OS_INFERIOR_NASAL_RESTRICTION: 0
OS_INFERIOR_TEMPORAL_RESTRICTION: 0
OD_INFERIOR_TEMPORAL_RESTRICTION: 0
OD_SUPERIOR_TEMPORAL_RESTRICTION: 0
OS_SUPERIOR_TEMPORAL_RESTRICTION: 0
OD_INFERIOR_NASAL_RESTRICTION: 0
OS_SUPERIOR_NASAL_RESTRICTION: 0

## 2024-10-29 ASSESSMENT — SLIT LAMP EXAM - LIDS: COMMENTS: GOOD POSITION

## 2024-10-29 ASSESSMENT — CUP TO DISC RATIO
OS_RATIO: 0.6
OD_RATIO: 0.6

## 2024-10-29 ASSESSMENT — VISUAL ACUITY
OD_CC+: +2
OS_CC: 20/50
CORRECTION_TYPE: GLASSES
METHOD: SNELLEN - LINEAR
OD_CC: 20/30
OS_CC+: +1

## 2024-10-29 ASSESSMENT — EXTERNAL EXAM - RIGHT EYE: OD_EXAM: NORMAL

## 2024-10-29 ASSESSMENT — TONOMETRY
OS_IOP_MMHG: 16
OD_IOP_MMHG: 22
IOP_METHOD: TONOPEN

## 2024-10-29 ASSESSMENT — PACHYMETRY
OD_CT(UM): 622
OS_CT(UM): 644

## 2024-10-31 ENCOUNTER — APPOINTMENT (OUTPATIENT)
Dept: PAIN MEDICINE | Facility: CLINIC | Age: 79
End: 2024-10-31
Payer: MEDICARE

## 2024-10-31 ENCOUNTER — PREP FOR PROCEDURE (OUTPATIENT)
Dept: PAIN MEDICINE | Facility: HOSPITAL | Age: 79
End: 2024-10-31

## 2024-10-31 DIAGNOSIS — Z97.8 PRESENCE OF INTRATHECAL PUMP: Primary | ICD-10-CM

## 2024-10-31 DIAGNOSIS — G57.71 CAUSALGIA OF RIGHT LOWER EXTREMITY: ICD-10-CM

## 2024-10-31 DIAGNOSIS — E11.42 DIABETIC PERIPHERAL NEUROPATHY (MULTI): ICD-10-CM

## 2024-10-31 DIAGNOSIS — E11.42 DIABETIC PERIPHERAL NEUROPATHY (MULTI): Primary | ICD-10-CM

## 2024-10-31 DIAGNOSIS — G57.71 CAUSALGIA OF RIGHT LOWER EXTREMITY: Primary | ICD-10-CM

## 2024-10-31 DIAGNOSIS — G62.89 OTHER POLYNEUROPATHY: ICD-10-CM

## 2024-10-31 PROCEDURE — 99214 OFFICE O/P EST MOD 30 MIN: CPT | Performed by: ANESTHESIOLOGY

## 2024-10-31 PROCEDURE — 1036F TOBACCO NON-USER: CPT | Performed by: ANESTHESIOLOGY

## 2024-10-31 PROCEDURE — 62370 ANL SP INF PMP W/MDREPRG&FIL: CPT | Performed by: NURSE PRACTITIONER

## 2024-10-31 RX ORDER — DIAZEPAM 5 MG/1
5 TABLET ORAL ONCE AS NEEDED
OUTPATIENT
Start: 2024-10-31

## 2024-10-31 RX ORDER — FENTANYL CITRATE 50 UG/ML
2000 INJECTION, SOLUTION INTRAMUSCULAR; INTRAVENOUS ONCE
Status: SHIPPED | OUTPATIENT
Start: 2024-10-31

## 2024-11-06 ENCOUNTER — HOSPITAL ENCOUNTER (OUTPATIENT)
Dept: OPERATING ROOM | Facility: CLINIC | Age: 79
Setting detail: OUTPATIENT SURGERY
Discharge: HOME | End: 2024-11-06
Payer: MEDICARE

## 2024-11-06 VITALS
BODY MASS INDEX: 30.36 KG/M2 | SYSTOLIC BLOOD PRESSURE: 140 MMHG | HEIGHT: 69 IN | TEMPERATURE: 97.5 F | WEIGHT: 205 LBS | HEART RATE: 60 BPM | OXYGEN SATURATION: 98 % | DIASTOLIC BLOOD PRESSURE: 67 MMHG | RESPIRATION RATE: 18 BRPM

## 2024-11-06 DIAGNOSIS — E11.42 DIABETIC PERIPHERAL NEUROPATHY (MULTI): ICD-10-CM

## 2024-11-06 PROCEDURE — 2500000004 HC RX 250 GENERAL PHARMACY W/ HCPCS (ALT 636 FOR OP/ED): Performed by: PHYSICAL MEDICINE & REHABILITATION

## 2024-11-06 PROCEDURE — 2500000004 HC RX 250 GENERAL PHARMACY W/ HCPCS (ALT 636 FOR OP/ED): Performed by: STUDENT IN AN ORGANIZED HEALTH CARE EDUCATION/TRAINING PROGRAM

## 2024-11-06 PROCEDURE — 64555 IMPLANT NEUROELECTRODES: CPT | Performed by: PHYSICAL MEDICINE & REHABILITATION

## 2024-11-06 RX ORDER — CEFAZOLIN SODIUM 2 G/50ML
2 SOLUTION INTRAVENOUS ONCE
Status: COMPLETED | OUTPATIENT
Start: 2024-11-06 | End: 2024-11-06

## 2024-11-06 RX ORDER — LIDOCAINE HYDROCHLORIDE 5 MG/ML
INJECTION, SOLUTION INFILTRATION; INTRAVENOUS AS NEEDED
Status: COMPLETED | OUTPATIENT
Start: 2024-11-06 | End: 2024-11-06

## 2024-11-06 RX ORDER — DIAZEPAM 5 MG/1
5 TABLET ORAL ONCE AS NEEDED
Status: DISCONTINUED | OUTPATIENT
Start: 2024-11-06 | End: 2024-11-07 | Stop reason: HOSPADM

## 2024-11-06 ASSESSMENT — PAIN SCALES - GENERAL
PAINLEVEL_OUTOF10: 5 - MODERATE PAIN
PAINLEVEL_OUTOF10: 0 - NO PAIN
PAINLEVEL_OUTOF10: 0 - NO PAIN

## 2024-11-06 ASSESSMENT — COLUMBIA-SUICIDE SEVERITY RATING SCALE - C-SSRS
2. HAVE YOU ACTUALLY HAD ANY THOUGHTS OF KILLING YOURSELF?: NO
1. IN THE PAST MONTH, HAVE YOU WISHED YOU WERE DEAD OR WISHED YOU COULD GO TO SLEEP AND NOT WAKE UP?: NO
6. HAVE YOU EVER DONE ANYTHING, STARTED TO DO ANYTHING, OR PREPARED TO DO ANYTHING TO END YOUR LIFE?: NO

## 2024-11-06 ASSESSMENT — PAIN - FUNCTIONAL ASSESSMENT
PAIN_FUNCTIONAL_ASSESSMENT: 0-10

## 2024-11-06 NOTE — H&P
HISTORY AND PHYSICAL    History Of Present Illness  Amrik Gould is a 79 y.o. male presenting with chronic pain here for procedure as stated below. Patient denies any changes to health since the last visit to our clinic.    Past Medical History  Past Medical History:   Diagnosis Date    Amblyopia     Arthritis     Cataract     Delayed emergence from general anesthesia     Depression     Diabetes mellitus (Multi)     Hemodialysis status (CMS-[facility])     Neuropathy in diabetes (Multi)     Obstructive sleep apnea (adult) (pediatric) 07/09/2014    Obstructive sleep apnea    Other conditions influencing health status     Osteoarthritis    Other conditions influencing health status     Coronary Artery Disease    Peripheral neuropathy     Personal history of other diseases of the circulatory system     History of hypertension    Personal history of other diseases of the digestive system     History of esophageal reflux    Personal history of other diseases of the musculoskeletal system and connective tissue     Personal history of gout    Personal history of other diseases of the nervous system and sense organs     History of cataract    Personal history of other diseases of urinary system     History of chronic kidney disease    Personal history of other endocrine, nutritional and metabolic disease     History of diabetes mellitus    Personal history of other endocrine, nutritional and metabolic disease     History of hyperlipidemia    Spinal stenosis     Stroke (Multi)        Surgical History  Past Surgical History:   Procedure Laterality Date    AV FISTULA PLACEMENT Left     left upper medial arm    BACK SURGERY      CATARACT EXTRACTION Bilateral     CHOLECYSTECTOMY      CORONARY ARTERY BYPASS GRAFT      quadruple    CORONARY STENT PLACEMENT      x2    KNEE ARTHROSCOPY W/ DEBRIDEMENT Bilateral     LAMINECTOMY      LUMBAR FUSION  07/09/2014    Lumbar Vertebral Fusion    OTHER SURGICAL HISTORY  09/29/2014    Neurological  Reservoir/Pump Implantation Of Device Drug Inf    OTHER SURGICAL HISTORY  07/31/2019    Cataract surgery    SPINAL CORD STIMULATOR IMPLANT  11/15/2023    SPINE SURGERY  07/09/2014    Spine Repair    VASCULAR SURGERY      left leg stent placement        Social History  He reports that he has never smoked. He has never used smokeless tobacco. He reports that he does not drink alcohol and does not use drugs.    Family History  Family History   Problem Relation Name Age of Onset    Cancer Father Amrik     Diabetes Father Amrik     Arthritis Mother Angelica         Allergies  Patient has no known allergies.    Review of Systems  12 point ROS done and negative except for the above.     Physical Exam  General: NAD, well groomed, well nourished  Eyes: Non-icteric sclera, EOMI  Ears, Nose, Mouth, and Throat: External ears and nose appear to be without deformity or rash. No lesions or masses noted. Hearing is grossly intact.   Neck: Trachea midline  Respiratory: Nonlabored breathing   Cardiovascular: No peripheral edema   Skin: No rashes or open lesions/ulcers identified on skin.      Last Recorded Vitals  There were no vitals taken for this visit.    Relevant Results  Current Outpatient Medications   Medication Instructions    Accu-Chek Fastclix Lancet Drum misc 3 times daily,  USE TO TEST AS DIRECTED    Accu-Chek Guide test strips strip 3 times daily,  USE TO TEST BLOOD SUGARS AS DIRECTED    allopurinol (ZYLOPRIM) 100 mg, oral, Daily    ascorbic acid (VITAMIN C) 1,000 mg, oral, Daily    aspirin 81 mg, oral, Daily    atorvastatin (LIPITOR) 40 mg, oral    atorvastatin (LIPITOR) 20 mg, oral, Daily    carvedilol (COREG) 3.125 mg, oral, 2 times daily    carvedilol (COREG) 3.125 mg, oral, 2 times daily (morning and late afternoon)    cholecalciferol (Vitamin D-3) 10 MCG (400 UNIT) tablet oral    cinacalcet (SENSIPAR) 30 mg, oral, Daily, Take with food or shortly afer a meal. Swallow tablet whole; do not break or divide.     clotrimazole-betamethasone (Lotrisone) cream 1 Application, Topical, 2 times daily, to affected area    co-enzyme Q-10 30 mg, oral, Daily    ergocalciferol (VITAMIN D-2) 50,000 Units, oral, Once Weekly    esomeprazole (NEXIUM) 20 mg, oral, Daily before breakfast, Do not open capsule.    gabapentin (NEURONTIN) 200 mg, oral, 2 times daily    krill oil 500 mg capsule oral    midodrine (PROAMATINE) 10 mg, oral, 3 times weekly,  Take one tablet on the way to dialysis. Do not take carvedilol before dialysis.      mupirocin (Bactroban) 2 % ointment Use three days before and 2 days after surgery    naloxone (Narcan) 4 mg/0.1 mL nasal spray 0.1 mL, nasal, As needed, FOR ACCIDENTAL OVERDOSE    pantoprazole (PROTONIX) 40 mg, oral, Daily    Lesley-Victor Hugo 0.8 mg tablet TAKE 1 TABLET BY MOUTH EVERY DAY IN AFTERNOON    Lesley-Victor Hugo Rx 1- mg-mg-mcg tablet 1 tablet, oral, Daily    sertraline (Zoloft) 50 mg tablet oral    sevelamer carbonate (Renvela) 800 mg tablet 3 tablets, oral, 3 times daily (morning, midday, late afternoon)    sodium chloride 0.9% parenteral solution 100 mL with cefepime 2 gram recon soln 1 g 1 g, intravenous, 3 times weekly    sorbitol 70 % solution 30 mL, oral, Every 4 hours PRN, until loos BM then prn      topiramate (TOPAMAX) 50 mg, oral,  in addition to regular prescription after each dialysis session      traMADol (ULTRAM) 50 mg, oral, As needed, after each dialysis session    vancomycin/0.9 % sod chloride (vancomycin in 0.9 % sodium chl) 1 gram/250 mL solution intravenous    zinc gluconate 50 mg tablet 50 mg of elemental zinc, oral, Daily       No results found for this or any previous visit from the past 1000 days.     No image results found.     No diagnosis found.        Assessment/Plan   Amrik Gould is a 79 y.o. 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    ASA PS Classification: 3  Mallampati score: 2    Plan:  - We will proceed with the procedure as above. We discussed extensively the risks, benefits, and alternatives to the procedure. The patient's questions were addressed and answered in detail. The patient demonstrated understanding of the procedure, and is amenable to proceeding with it. The Risks of the procedure that were discussed with the patient include but are not limited to the following: A lack of efficacy, transient worsening of pain, bleeding, infection, nerve injury, nerve damage, neuritis or sunburn sensation. Informed consent obtained as attached to EMR.  - Patient to continue physician directed home exercises as tolerated.  - Patient will follow-up with us in clinic or with another Interventional Pain Physician.      Vera Escobar MD  Chronic Pain Fellow  [facility]

## 2024-11-07 ASSESSMENT — PAIN SCALES - GENERAL: PAINLEVEL_OUTOF10: 9

## 2024-11-07 NOTE — ADDENDUM NOTE
Encounter addended by: Tejal Hicks RN on: 11/7/2024 10:59 AM   Actions taken: Contacts section saved, Flowsheet accepted

## 2024-11-11 DIAGNOSIS — G57.71 CAUSALGIA OF RIGHT LOWER EXTREMITY: Primary | ICD-10-CM

## 2024-11-12 RX ORDER — TOPIRAMATE 50 MG/1
50 TABLET, FILM COATED ORAL 2 TIMES DAILY
Qty: 180 TABLET | Refills: 3 | Status: SHIPPED | OUTPATIENT
Start: 2024-11-12

## 2024-11-12 RX ORDER — GABAPENTIN 100 MG/1
CAPSULE ORAL
Qty: 258 CAPSULE | Refills: 3 | Status: SHIPPED | OUTPATIENT
Start: 2024-11-12

## 2024-11-22 ENCOUNTER — APPOINTMENT (OUTPATIENT)
Dept: PAIN MEDICINE | Facility: CLINIC | Age: 79
End: 2024-11-22
Payer: MEDICARE

## 2025-04-29 ENCOUNTER — APPOINTMENT (OUTPATIENT)
Dept: OPHTHALMOLOGY | Facility: CLINIC | Age: 80
End: 2025-04-29
Payer: MEDICARE

## (undated) DEVICE — SUTURE, PDS II, 4-0, 18 IN, PS2, CLEAR

## (undated) DEVICE — DRAPE, SHEET, 17 X 23 IN

## (undated) DEVICE — SUTURE, VICRYL, 0, 36 IN, CT-1, UNDYED

## (undated) DEVICE — SYRINGE, GLASS, 5 CC, LUER LOK

## (undated) DEVICE — SUTURE, VICRYL, 0, 27 IN, CT-1, UNDYED

## (undated) DEVICE — Device

## (undated) DEVICE — SUTURE, NUROLON, 0, 18 IN, CT1, DETACHABLE, MULTIPACK, BLACK

## (undated) DEVICE — GLOVE, SURGICAL, PROTEXIS PI BLUE W/NEUTHERA, 6.5, PF, LF

## (undated) DEVICE — TOWEL, SURGICAL, NEURO, O/R, 16 X 26, BLUE, STERILE

## (undated) DEVICE — SUTURE, PDS II, 2-0, 27 IN, SH, VIOLET

## (undated) DEVICE — NEEDLE, HYPODERMIC, REGULAR WALL, REGULAR BEVEL, 25 G X 1.5 IN

## (undated) DEVICE — NEEDLE, HYPODERMIC, REGULAR WALL, REGULAR BEVEL, 22 G X 1 IN

## (undated) DEVICE — APPLICATOR, PREP, CHLORAPREP, W/ORANGE TINT, 10.5ML

## (undated) DEVICE — DRAPE, SHEET, UTILITY, NON ABSORBENT, 18 X 26 IN, LF

## (undated) DEVICE — DRAPE, INCISE, ANTIMICROBIAL, IOBAN 2, LARGE, 17 X 23 IN, DISPOSABLE, STERILE

## (undated) DEVICE — SYRINGE, 12 CC, LUER LOCK, CONTROL, MONOJECT

## (undated) DEVICE — PROBE COVER

## (undated) DEVICE — ADHESIVE, SKIN, LIQUIBAND EXCEED

## (undated) DEVICE — IMPLANTABLE DEVICE: Type: IMPLANTABLE DEVICE | Site: LEG | Status: NON-FUNCTIONAL

## (undated) DEVICE — DRAPE, LAPAROTOMY II, PEDIATRIC

## (undated) DEVICE — DRAPE COVER, C ARM, FLOUROSCAN IMAGING SYS

## (undated) DEVICE — SUTURE, PDS II, 0, 18 IN, CT-1, VIOLET

## (undated) DEVICE — DRAPE, SHEET, ENDOSCOPY, GENERAL, FENESTRATED, ARMBOARD COVER, 98 X 123.5 IN, DISPOSABLE, LF, STERILE

## (undated) DEVICE — BLADE, CARBON STEEL, 10, STERILE, DISP